# Patient Record
Sex: FEMALE | Race: BLACK OR AFRICAN AMERICAN | Employment: UNEMPLOYED | URBAN - METROPOLITAN AREA
[De-identification: names, ages, dates, MRNs, and addresses within clinical notes are randomized per-mention and may not be internally consistent; named-entity substitution may affect disease eponyms.]

---

## 2017-11-07 ENCOUNTER — OFFICE VISIT (OUTPATIENT)
Dept: FAMILY MEDICINE CLINIC | Age: 52
End: 2017-11-07

## 2017-11-07 VITALS
WEIGHT: 163 LBS | SYSTOLIC BLOOD PRESSURE: 132 MMHG | HEART RATE: 83 BPM | OXYGEN SATURATION: 97 % | HEIGHT: 62 IN | RESPIRATION RATE: 18 BRPM | DIASTOLIC BLOOD PRESSURE: 98 MMHG | TEMPERATURE: 97.2 F | BODY MASS INDEX: 30 KG/M2

## 2017-11-07 DIAGNOSIS — F44.81 DISSOCIATIVE IDENTITY DISORDER (HCC): ICD-10-CM

## 2017-11-07 DIAGNOSIS — F11.20 UNCOMPLICATED OPIOID DEPENDENCE (HCC): ICD-10-CM

## 2017-11-07 DIAGNOSIS — I10 ESSENTIAL HYPERTENSION: ICD-10-CM

## 2017-11-07 DIAGNOSIS — F19.94 SUBSTANCE INDUCED MOOD DISORDER (HCC): ICD-10-CM

## 2017-11-07 DIAGNOSIS — F11.20 POLYSUBSTANCE DEPENDENCE INCLUDING OPIOID TYPE DRUG, CONTINUOUS USE (HCC): Chronic | ICD-10-CM

## 2017-11-07 DIAGNOSIS — E55.9 VITAMIN D DEFICIENCY: ICD-10-CM

## 2017-11-07 DIAGNOSIS — Z72.0 TOBACCO ABUSE: ICD-10-CM

## 2017-11-07 DIAGNOSIS — F19.20 POLYSUBSTANCE DEPENDENCE INCLUDING OPIOID TYPE DRUG, CONTINUOUS USE (HCC): Chronic | ICD-10-CM

## 2017-11-07 DIAGNOSIS — Z23 ENCOUNTER FOR IMMUNIZATION: ICD-10-CM

## 2017-11-07 DIAGNOSIS — F31.9 BIPOLAR AFFECTIVE DISORDER, REMISSION STATUS UNSPECIFIED (HCC): ICD-10-CM

## 2017-11-07 DIAGNOSIS — F41.9 ANXIETY: Primary | ICD-10-CM

## 2017-11-07 DIAGNOSIS — E78.00 PURE HYPERCHOLESTEROLEMIA: ICD-10-CM

## 2017-11-07 RX ORDER — CYCLOBENZAPRINE HCL 10 MG
10 TABLET ORAL
COMMUNITY
End: 2018-02-23

## 2017-11-07 RX ORDER — LISINOPRIL 20 MG/1
20 TABLET ORAL DAILY
Qty: 30 TAB | Refills: 2 | Status: SHIPPED | OUTPATIENT
Start: 2017-11-07 | End: 2018-02-13 | Stop reason: SDUPTHER

## 2017-11-07 NOTE — PROGRESS NOTES
This note will not be viewable in [address] [facility]   11/07/2017  Patient ID: Herminia Shore is a 47 y.o. female. Assessment/Plan:    Diagnoses and all orders for this visit:    1. Anxiety  2. Bipolar affective disorder, remission status unspecified ([facility]  3. Dissociative identity disorder  Polysubstance dependence including opioid type drug, continuous use (HCC)  Uncomplicated opioid dependence ([facility]  Substance induced mood disorder ([facility]  History of overdose  Patient with multiple significant psychiatric diagnoses, including those above. Chart review also notable for borderline personality, h/o overdose, misuse of opiates. She admits to remote history of substance use. Chart review notable for  h/o Utox that were positive for cocaine in 2009, 2012, 2013, 2016, and 2010 (twice) along with other substances (unclear what was prescribed at that time). Additionally xaxnax is not on her current medication list and was last filled in April 2017. Each prescription (three) she has had in the last year was from a different prescriber. She also had one Rx for valium from a different provider. Therefore, advised patient that I am not comfortable prescribing benzodiazepine today. I recommended that specialist management with psychiatry is most appropriate. Her  was able to share that she has an appointment scheduled on 11/30/17. They were unable to share who the appointment is with. If they can provide that information, our office can call to help facilitate sooner follow up if able. Patient expressed dissatisfaction and requested additional time to discuss additional medical problems today. Advised again that these can be handled on follow up. 4. Tobacco abuse  Not ready to quit, but is cutting back, congratulated on this    5. Vitamin D deficiency  6. Pure hypercholesterolemia  7.  Essential hypertension  BP not at goal. Ok to add lisinopril back to regimen regularly. 8. Encounter for immunization  -     LA IMMUNIZ ADMIN,1 SINGLE/COMB VAC/TOXOID  -     Influenza virus vaccine (QUADRIVALENT PRES FREE SYRINGE) IM ([id number])    Other orders  -     lisinopril (PRINIVIL, ZESTRIL) 20 mg tablet; Take 1 Tab by mouth daily. Counselled pt on Patient health concerns and plans. Patient was offered a choice/choices in the treatment plan today. Reviewed return precautions as appropriate. Patient expresses understanding of the plan and agrees with recommendations. See patient instructions for more. Next visit: discuss headaches, follow up BP and labs  More than 50% of this >60 minute encounter was spent in counseling and coordination of care today. Patient Instructions   . TODAY, please go to:   Flu vaccine     CHECK OUT     Please schedule the following appointments at [facility] OUT:  · Headache follow up with Dr. Lavinia Kehr in mid November  · Lab visit, fasting the week before our visit. Today's Plan:      Okay to restart lisinopril    For sleep: try melatonin over the counter. Discuss alternatives with your psychiatrist in late November     Subjective:   HPI:  Tim Diaz is a 47 y.o. female being seen for:   Chief Complaint   Patient presents with   [facility]    Medication Refill    Headache    Immunization/Injection       Here with  Cindy- from [facility]. She has a safety plan. First day with counselor today. Past medical history, surgical history, social history, family history, medications, allergies reviewed and updated. See below for more detail. · Lots of stress with family members being ill  · dtr just out of hosp  · Uses atarax d/t itching, dry patches all over inter  · Was taking claritin- D  · Was on a sleeping pill.    · At home says BPs were normal, can't recall numbers  · Has cut back on smoking, not ready to quit  · Asks about fioricet and sleeping pill  · Would like xanax  · No current psychiatrist  · Will be seeing psychiatry Nov 30     Review of Systems  Otherwise as noted in HPI  ? Objective:     Visit Vitals    BP (!) 132/98 (BP 1 Location: Left arm, BP Patient Position: Sitting)    Pulse 83    Temp 97.2 °F (36.2 °C) (Oral)    Resp 18    Ht 5' 2\" (1.575 m)    Wt 163 lb (73.9 kg)    SpO2 97%    BMI 29.81 kg/m2     Wt Readings from Last 3 Encounters:   11/07/17 163 lb (73.9 kg)   11/14/16 158 lb 4.6 oz (71.8 kg)   11/06/16 158 lb 4.6 oz (71.8 kg)     BP Readings from Last 3 Encounters:   11/07/17 (!) 132/98   11/14/16 129/84   11/10/16 (!) 160/100     No flowsheet data found. Physical Exam   Constitutional: She appears well-developed and well-nourished. No distress. Pulmonary/Chest: Effort normal. No respiratory distress. Neurological: She is alert. Psychiatric: She is not actively hallucinating. She does not exhibit a depressed mood. Patient referenced her \"other selves\" in conversation, stating that they are not as nice    When upset during conversation today, she stepped outside and went to smoke a cigarette    Acknowledges lots of stress       Allergies   Allergen Reactions    Vicodin [Hydrocodone-Acetaminophen] Itching     Prior to Admission medications    Medication Sig Start Date End Date Taking? Authorizing Provider   cyclobenzaprine (FLEXERIL) 10 mg tablet Take 10 mg by mouth two (2) times daily as needed for Muscle Spasm(s). Yes Historical Provider   lisinopril (PRINIVIL, ZESTRIL) 20 mg tablet Take 1 Tab by mouth daily. 11/7/17  Yes Dean Hodgkins Ruthine Hailstone, MD   albuterol (VENTOLIN HFA) 90 mcg/actuation inhaler 2 PUFFS EVERY 4 HOURS AS NEEDED FOR WHEEZING OR SHORTNESS OF BREATH 7/24/17  Yes Starr Rendon DO   hydrOXYzine HCl (ATARAX) 25 mg tablet TAKE 1-2 TABLETS THREE TIMES A DAY AS NEEDED FOR ITCHING OR ANXIETY 1/4/17  Yes Mary Ellen Triplett MD   diclofenac EC (VOLTAREN) 75 mg EC tablet Take 75 mg by mouth two (2) times a day.  10/7/16  Yes Historical Provider albuterol (PROVENTIL VENTOLIN) 2.5 mg /3 mL (0.083 %) nebulizer solution 3 mL by Nebulization route every four (4) hours as needed for Wheezing or Shortness of Breath. 10/20/16  Yes Immanuel Weeks NP   beclomethasone (QVAR) 40 mcg/actuation inhaler Take 2-4 Puffs by inhalation two (2) times a day. For asthma. Use higher dose when symptoms flare up 10/20/16  Yes Immanuel Weeks NP   fluticasone [facility]) 50 mcg/actuation nasal spray USE 2 SPRAYS IN EACH NOSTRIL EVERY DAY 10/20/16  Yes Immanuel Weeks NP   famotidine (PEPCID AC) 20 mg tablet Take 1 Tab by mouth nightly. 10/20/16  Yes Immanuel Weeks NP   loratadine-pseudoephedrine (LORATADINE-D) 5-120 mg per tablet TAKE 1 TABLET EVERY 12 HOURS AS NEEDED (CONGESTION/ALLERGY) 10/20/16  Yes Immanuel Weeks NP   polyethylene glycol (MIRALAX) 17 gram/dose powder MIX 17 GM (FILL CAP FULL WITH POWDER) WITH 8OZ OF WATER, AND DRINK, ONCE A DAY, FOR CONSTIPATION 10/20/16  Yes Immanuel Weeks NP   tiotropium (SPIRIVA WITH HANDIHALER) 18 mcg inhalation capsule USE 1 CAPSULE DAILY, VIA INHALER DEVICE 10/20/16  Yes Immanuel Weeks NP   pravastatin (PRAVACHOL) 40 mg tablet Take 1 Tab by mouth nightly.  For cholesterol 3/13/16  Yes Semaj Mccarty MD     Patient Active Problem List   Diagnosis Code    Pelvic pain in female R10.2    Diverticulosis K57.90    Fibromyalgia M79.7    Hypertension I10    Migraines G43.909    Vitamin D deficiency E55.9    Hyperlipidemia E78.5    Environmental allergies Z91.09    Radicular neuropathy M54.10    Tobacco abuse Z72.0    Abdominal gas pain R14.1    GERD (gastroesophageal reflux disease) K21.9    COPD (chronic obstructive pulmonary disease) ([facility]) J44.9    Chronic pain G89.29    Opiate dependence ([facility]) F11.20    Numbness and tingling of right arm R20.0, R20.2    Risk for falls Z91.81    Cocaine abuse F14.10    Substance induced mood disorder ([facility]) F19.94    Polysubstance dependence including opioid type drug, continuous use ([facility] F11.20, F19.20    Borderline personality disorder F60.3    Non-compliance with treatment Z91.19    Malingering Z76.5    Displacement of cervical intervertebral disc without myelopathy M50.20    Anxiety F41.9    Insomnia G47.00    History of drug overdose Z91.89    Narcotic induced mental alteration F99    Acute metabolic encephalopathy Y29.19    Stenosis of both internal carotid arteries I65.23    Syncope and collapse R55    Hemiplegia affecting left nondominant side (HCC) G81.94    Major depression F32.9    Somatization disorder F45.0    Bipolar disorder ([facility] F31.9    Dissociative identity disorder F44.80       . Social History     Social History    Marital status: SINGLE     Spouse name: N/A    Number of children: 1    Years of education: N/A     Occupational History    disabled from pain/psych Not Employed     Social History Main Topics    Smoking status: Current Every Day Smoker     Packs/day: 1.00     Types: Cigarettes     Start date: 1978    Smokeless tobacco: Never Used      Comment: 1 ppd  started age 11. Working on using E-cigs to quit    Alcohol use Yes      Comment: socially, usually 2 at a time. max 3 at a time     Drug use: No      Comment: remote h/o drug use in early 2000s - cocaine, MJ    Sexual activity: Not Currently     Partners: Male     Other Topics Concern    Not on file     Social History Narrative    T The patient has one adult dgtr. The patient's source of income comes from disability. The patient has not been a victim of sexual/physical abuse. The highest grade achieved is 10th . The patient is single. pt now on disability, after rejection several times. Patient with history of working as a  before quitting. Patient completed 2 years of college at [employer]. Lives with niece.        Past Medical History:   Diagnosis Date    Anxiety     Arthritis     Bipolar disorder ([facility]     bi polar Dr. John Lynn has dismissed her as a pt    Cocaine abuse 10/12 Based on ER urine tox screen    COPD     Dissociative identity disorder     Drug overdose, intentional ([address]     Drug-induced delirium ([facility] 2016    Fibromyalgia 2007    Rheum Danyelle Carvalho p [phone], f [phone]    Fracture of lumbar spine ([address]     Found on MRI    Hyperlipidemia 2011    Hypertension     Migraines     Multiple personality disorder     Neuropathy     Radicular neuropathy     Dr. Murphy Rico at [facility]. Neck and back.  Suicidal ideations     Suicidal overdose ([address] 2016    Tobacco abuse     Vitamin D deficiency        Past Surgical History:   Procedure Laterality Date    HX CARPAL TUNNEL RELEASE Left 2012    left    HX CERVICAL FUSION  14    C5-C6 Dr. Rahul Cerda HX COLONOSCOPY  2011    Dr. Clari Mock -     HX ORTHOPAEDIC  2009    left rotator cuff surgery in 2009    HX SKIN BIOPSY      rash       OB History      Para Term  AB Living    2 1 1  1 1    SAB TAB Ectopic Molar Multiple Live Births     1    1        Obstetric Comments    Irregular- perimenopausal, hot flashes  H/o abnl pap: per pt, as of 2017 no  Paps with [address]            Family History   Problem Relation Age of Onset    High Cholesterol Mother     Heart Failure Father     Hypertension Father     Arthritis-rheumatoid Father     Stroke Father     Other Daughter      scleroderma and raynauds    Other Sister     Other Sister      sarcoidosis    Cancer Brother      liver?     Cancer Brother      ?pancreas    Hypertension Brother     Thyroid Disease Brother     Prostate Cancer Brother     Hypertension Brother     Hypertension Brother     Diabetes Brother     Heart Disease Brother     Hypertension Brother     Thyroid Cancer Brother

## 2017-11-07 NOTE — PROGRESS NOTES
Chief Complaint   Patient presents with    Establish Care    Medication Refill    Headache     1. Have you been to the ER, urgent care clinic since your last visit? Hospitalized since your last visit? No     2. Have you seen or consulted any other health care providers outside of the [address] [place] [address] since your last visit? Include any pap smears or colon screening. No     The patient was counseled on the dangers of tobacco use, and was advised to quit. Reviewed strategies to maximize success, including to quit. Health Maintenance Due   Topic Date Due    DTaP/Tdap/Td series (1 - Tdap) Discussed with patient today and advised to follow up.    07/18/1985    PAP AKA CERVICAL CYTOLOGY Discussed with patient today and advised to follow up.    10/20/2013    BREAST CANCER SCRN MAMMOGRAM Discussed with patient today and advised to follow up.    07/18/2014    Influenza Age 9 to Adult Discussed with patient today and advised to follow up.    08/01/2017     ACP is not on file, advised to return.

## 2017-11-07 NOTE — PATIENT INSTRUCTIONS
Katty Bedolla TODAY, please go to:   Flu vaccine     CHECK OUT     Please schedule the following appointments at [facility] OUT:  · Headache follow up with Dr. Naseem Garcia in mid November  · Lab visit, fasting the week before our visit. Today's Plan:      Okay to restart lisinopril    For sleep: try melatonin over the counter.  Discuss alternatives with your psychiatrist in late November

## 2017-11-07 NOTE — MR AVS SNAPSHOT
Visit Information Date & Time Provider Department Dept. Phone Encounter #  
 11/7/2017  2:00 PM [address] Marlena Adorno MD [facility] [phone] [id number] Upcoming Health Maintenance Date Due DTaP/Tdap/Td series (1 - Tdap) 7/18/1985 PAP AKA CERVICAL CYTOLOGY 10/20/2013 BREAST CANCER SCRN MAMMOGRAM 7/18/2014 Influenza Age 5 to Adult 8/1/2017 COLONOSCOPY 9/20/2021 Allergies as of 11/7/2017  Review Complete On: 11/7/2017 By: Luis Carlos Alvarez. Marlena Adorno MD  
  
 Severity Noted Reaction Type Reactions Vicodin [Hydrocodone-acetaminophen]  07/14/2010    Itching Current Immunizations  Reviewed on 11/9/2016 Name Date Influenza Vaccine 10/16/2015, 1/9/2009 Influenza Vaccine (Quad) PF 10/20/2016 Pneumococcal Vaccine (Unspecified Type) 1/9/2009 Not reviewed this visit You Were Diagnosed With   
  
 Codes Comments Bipolar affective disorder, remission status unspecified ([facility]     ICD-10-CM: F31.9 ICD-9-CM: 296.80 Vitals BP Pulse Temp Resp Height(growth percentile) Weight(growth percentile) (!) 132/98 (BP 1 Location: Left arm, BP Patient Position: Sitting) 83 97.2 °F (36.2 °C) (Oral) 18 5' 2\" (1.575 m) 163 lb (73.9 kg) LMP SpO2 BMI OB Status Smoking Status 03/01/2015 97% 29.81 kg/m2 Postmenopausal Current Every Day Smoker Vitals History BMI and BSA Data Body Mass Index Body Surface Area  
 29.81 kg/m 2 1.8 m 2 Preferred Pharmacy Pharmacy Name Phone [address], [address] Bobbi Severe [phone] Your Updated Medication List  
  
   
This list is accurate as of: 11/7/17  3:33 PM.  Always use your most recent med list.  
  
  
  
  
 * albuterol 2.5 mg /3 mL (0.083 %) nebulizer solution Commonly known as:  PROVENTIL VENTOLIN  
3 mL by Nebulization route every four (4) hours as needed for Wheezing or Shortness of Breath. * albuterol 90 mcg/actuation inhaler Commonly known as:  VENTOLIN HFA  
2 PUFFS EVERY 4 HOURS AS NEEDED FOR WHEEZING OR SHORTNESS OF BREATH  
  
 beclomethasone 40 mcg/actuation Aero Commonly known as:  QVAR Take 2-4 Puffs by inhalation two (2) times a day. For asthma. Use higher dose when symptoms flare up  
  
 cyclobenzaprine 10 mg tablet Commonly known as:  FLEXERIL Take 10 mg by mouth two (2) times daily as needed for Muscle Spasm(s). diclofenac EC 75 mg EC tablet Commonly known as:  VOLTAREN Take 75 mg by mouth two (2) times a day. famotidine 20 mg tablet Commonly known as:  PEPCID AC Take 1 Tab by mouth nightly. fluticasone 50 mcg/actuation nasal spray Commonly known as:  FLONASE  
USE 2 SPRAYS IN EACH NOSTRIL EVERY DAY  
  
 hydrOXYzine HCl 25 mg tablet Commonly known as:  ATARAX TAKE 1-2 TABLETS THREE TIMES A DAY AS NEEDED FOR ITCHING OR ANXIETY  
  
 loratadine-pseudoephedrine 5-120 mg per tablet Commonly known as:  LORATADINE-D  
TAKE 1 TABLET EVERY 12 HOURS AS NEEDED (CONGESTION/ALLERGY)  
  
 polyethylene glycol 17 gram/dose powder Commonly known as:  OrangeHRM Sukhjinder MIX 17 GM (FILL CAP FULL WITH POWDER) WITH 8OZ OF WATER, AND DRINK, ONCE A DAY, FOR CONSTIPATION  
  
 pravastatin 40 mg tablet Commonly known as:  PRAVACHOL Take 1 Tab by mouth nightly. For cholesterol  
  
 tiotropium 18 mcg inhalation capsule Commonly known as:  SPIRIVA WITH HANDIHALER  
USE 1 CAPSULE DAILY, VIA INHALER DEVICE * Notice: This list has 2 medication(s) that are the same as other medications prescribed for you. Read the directions carefully, and ask your doctor or other care provider to review them with you. Patient Instructions Preethi Hightower TODAY, please go to: 
 Flu vaccine CHECK OUT Please schedule the following appointments at [facility] OUT: 
· Headache follow up with Dr. Dom Monroe in mid November · Lab visit, fasting the week before our visit. Today's Plan: 
 
 
Okay to restart lisinopril For sleep: try melatonin over the counter. Discuss alternatives with your psychiatrist in late November Introducing [facility]! Umm Andersen introduces g[employer]One patient portal. Now you can access parts of your medical record, email your doctor's office, and request medication refills online. 1. In your internet browser, go to [email or web address] 2. Click on the First Time User? Click Here link in the Sign In box. You will see the New Member Sign Up page. 3. Enter your g[employer]One Access Code exactly as it appears below. You will not need to use this code after youve completed the sign-up process. If you do not sign up before the expiration date, you must request a new code. · [employer] Access Code: [email or web address] Expires: 2/5/2018  2:51 PM 
 
4. Enter the last four digits of your Social Security Number (xxxx) and Date of Birth (mm/dd/yyyy) as indicated and click Submit. You will be taken to the next sign-up page. 5. Create a g2One ID. This will be your g2One login ID and cannot be changed, so think of one that is secure and easy to remember. 6. Create a g[employer]One password. You can change your password at any time. 7. Enter your Password Reset Question and Answer. This can be used at a later time if you forget your password. 8. Enter your e-mail address. You will receive e-mail notification when new information is available in [address] 9. Click Sign Up. You can now view and download portions of your medical record. 10. Click the Download Summary menu link to download a portable copy of your medical information. If you have questions, please visit the Frequently Asked Questions section of the [employer]One website. Remember, g[employer]One is NOT to be used for urgent needs. For medical emergencies, dial 911. Now available from your iPhone and Android! Please provide this summary of care documentation to your next provider. Your primary care clinician is listed as German Rizzo. If you have any questions after today's visit, please call [phone].

## 2017-11-14 ENCOUNTER — OFFICE VISIT (OUTPATIENT)
Dept: FAMILY MEDICINE CLINIC | Age: 52
End: 2017-11-14

## 2017-11-14 VITALS
HEART RATE: 68 BPM | TEMPERATURE: 97.4 F | RESPIRATION RATE: 19 BRPM | HEIGHT: 62 IN | WEIGHT: 166.2 LBS | BODY MASS INDEX: 30.59 KG/M2 | DIASTOLIC BLOOD PRESSURE: 98 MMHG | OXYGEN SATURATION: 98 % | SYSTOLIC BLOOD PRESSURE: 141 MMHG

## 2017-11-14 DIAGNOSIS — K21.00 GASTROESOPHAGEAL REFLUX DISEASE WITH ESOPHAGITIS: ICD-10-CM

## 2017-11-14 DIAGNOSIS — J44.1 CHRONIC OBSTRUCTIVE PULMONARY DISEASE WITH ACUTE EXACERBATION (HCC): ICD-10-CM

## 2017-11-14 DIAGNOSIS — F41.9 ANXIETY: ICD-10-CM

## 2017-11-14 DIAGNOSIS — M54.2 NECK PAIN: Primary | ICD-10-CM

## 2017-11-14 RX ORDER — ALBUTEROL SULFATE 90 UG/1
AEROSOL, METERED RESPIRATORY (INHALATION)
Qty: 1 INHALER | Refills: 1 | Status: SHIPPED | OUTPATIENT
Start: 2017-11-14 | End: 2018-06-05 | Stop reason: SDUPTHER

## 2017-11-14 RX ORDER — FLUTICASONE PROPIONATE 50 MCG
SPRAY, SUSPENSION (ML) NASAL
Qty: 16 G | Refills: 5 | Status: SHIPPED | OUTPATIENT
Start: 2017-11-14 | End: 2018-06-27 | Stop reason: SDUPTHER

## 2017-11-14 RX ORDER — POLYETHYLENE GLYCOL 3350 17 G/17G
POWDER, FOR SOLUTION ORAL
Qty: 527 G | Refills: 0 | Status: SHIPPED | OUTPATIENT
Start: 2017-11-14 | End: 2018-06-27 | Stop reason: SDUPTHER

## 2017-11-14 RX ORDER — FAMOTIDINE 20 MG/1
20 TABLET, FILM COATED ORAL
Qty: 30 TAB | Refills: 2 | Status: SHIPPED | OUTPATIENT
Start: 2017-11-14 | End: 2018-03-13 | Stop reason: SDUPTHER

## 2017-11-14 NOTE — PROGRESS NOTES
S: Olga Del Toro is a 47 y.o. female who presents for multiple requests for medication refill. Assessment/Plan:  1. Pt here with request to fill multiple medications  -explained to pt (who saw Dr. Carlotta Rogel last week,) that she was a complicated pt due to multiple comorbidities and she would need to see Dr. Carlotta Rogel for med refills  -agreed to refill: albuterol, claritin, spiriva, mirilax, pepcid, and flonase    2. Neck pain  -pt requesting referral to go back to ortho for neck pain   -referral to ortho     Consulted with Dr. Carlotta Rogel and Sherry Simmons         HPI:  Pt is accompanied by . Pt's appt was made for headache complaint.  (Sherry Simmons) called pt yesterday to verify this, after pt had been to see Dr. Carlotta Rogel last week, however pt denies this is why she is at the appt today and states she is here for multiple problems and medications refill. She is difficult to keep on subject. Disjointed and contradicting statements. Pt stated she has pain - especially neck and HA. Requested referral to go back to see the ortho who operated on her neck  Also stated she is a pt at a pain clinic and isn't asking for pain meds    Pt did request psych meds, but then said she does have an appt on Nov. 30th to see psych    Pt states she didn't get her blood pressure medication, but a check of the medications showed that Dr. Carlotta Rogel did send in a rx of lisinopril with 2 refills    Sherry Simmons came into the room with pt and we discussed present visit and discussion between [place] and pt yesterday. Pt agreed that she did not listen well to conversation and did not hear that this was for an acute visit only. Agreed to have brief exam so respiratory meds could be refilled and pt will make another appt with Dr. Carlotta Rogel. Pt verbalizes dissatisfaction with Dr. Carlotta Rogel and Juan Saunders offered to look for another PCP for pt, but pt declines.      Has COPD - uses inhaler   Gets SOB - active     Social History:  Nutrition: not really   Social: has 24 yo daughter with scleraderma  Tobacco - 1 ppd for past 30 years      Review of Systems:  - Constitutional Symptoms: no fevers, chills, weight loss  - Cardiovascular:  No palpitations or chest pain   - Respiratory: + cough or shortness of breath  - Gastrointestinal: no dysphagia or abdominal pain  - Musculoskeletal: no muscle pains or weakness  - Neurological: no numbness, tingling, or headaches    I reviewed the following:  Past Medical History:   Diagnosis Date    Anxiety     Arthritis     Bipolar disorder ([address]     bi polar Dr. Edy Schmidt has dismissed her as a pt    Cocaine abuse 10/12    Based on ER urine tox screen    COPD     Dissociative identity disorder     Drug overdose, intentional ([address]     Drug-induced delirium ([address] 11/11/2016    Fibromyalgia 6/2007    [facility] p [phone], f [phone]    Fracture of lumbar spine ([address]     Found on MRI    Hyperlipidemia 9/29/2011    Hypertension     Migraines     Multiple personality disorder     Neuropathy     Radicular neuropathy     Dr[facility] Lynnette [facility] at [facility]. Neck and back.  Suicidal ideations     Suicidal overdose ([address] 11/6/2016    Tobacco abuse     Vitamin D deficiency        Current Outpatient Prescriptions   Medication Sig Dispense Refill    cyclobenzaprine (FLEXERIL) 10 mg tablet Take 10 mg by mouth two (2) times daily as needed for Muscle Spasm(s).  lisinopril (PRINIVIL, ZESTRIL) 20 mg tablet Take 1 Tab by mouth daily. 30 Tab 2    albuterol (VENTOLIN HFA) 90 mcg/actuation inhaler 2 PUFFS EVERY 4 HOURS AS NEEDED FOR WHEEZING OR SHORTNESS OF BREATH 1 Inhaler 1    hydrOXYzine HCl (ATARAX) 25 mg tablet TAKE 1-2 TABLETS THREE TIMES A DAY AS NEEDED FOR ITCHING OR ANXIETY 60 Tab 0    diclofenac EC (VOLTAREN) 75 mg EC tablet Take 75 mg by mouth two (2) times a day.       albuterol (PROVENTIL VENTOLIN) 2.5 mg /3 mL (0.083 %) nebulizer solution 3 mL by Nebulization route every four (4) hours as needed for Wheezing or Shortness of Breath. 25 Each 0    fluticasone (FLONASE) 50 mcg/actuation nasal spray USE 2 SPRAYS IN EACH NOSTRIL EVERY DAY 16 g 5    famotidine (PEPCID AC) 20 mg tablet Take 1 Tab by mouth nightly. 30 Tab 2    loratadine-pseudoephedrine (LORATADINE-D) 5-120 mg per tablet TAKE 1 TABLET EVERY 12 HOURS AS NEEDED (CONGESTION/ALLERGY) 30 Tab 2    polyethylene glycol (MIRALAX) 17 gram/dose powder MIX 17 GM (FILL CAP FULL WITH POWDER) WITH 8OZ OF WATER, AND DRINK, ONCE A DAY, FOR CONSTIPATION 527 g 0    tiotropium (SPIRIVA WITH HANDIHALER) 18 mcg inhalation capsule USE 1 CAPSULE DAILY, VIA INHALER DEVICE 30 Cap 2    pravastatin (PRAVACHOL) 40 mg tablet Take 1 Tab by mouth nightly. For cholesterol 30 Tab 11    beclomethasone (QVAR) 40 mcg/actuation inhaler Take 2-4 Puffs by inhalation two (2) times a day. For asthma. Use higher dose when symptoms flare up 1 Inhaler 11       Allergies   Allergen Reactions    Vicodin [Hydrocodone-Acetaminophen] Itching        O: VS:   Visit Vitals    BP (!) 141/98 (BP 1 Location: Left arm, BP Patient Position: Sitting)    Pulse 68    Temp 97.4 °F (36.3 °C) (Oral)    Resp 19    Ht 5' 2\" (1.575 m)    Wt 166 lb 3.2 oz (75.4 kg)    LMP 03/01/2015    SpO2 98%    BMI 30.4 kg/m2       GENERAL: Cal Clarke is in no acute distress. Non-toxic. Well nourished. Well developed. Appropriately groomed. HEAD:  Normocephalic. Atraumatic. Non tender sinuses x 4. NOSE: Patent. Nasal turbinates pink. No discharge. NECK: supple. Midline trachea. No carotid bruits noted bilaterally. No anterior cervical lymphadenopathy noted. RESP: Breath sounds are symmetrical bilaterally. Unlabored without SOB. Speaking in full sentences. Clear to auscultation bilaterally anteriorly and posteriorly. No wheezes. No rales or rhonchi. CV: normal rate. Regular rhythm. S1, S2 audible. No murmur noted. No rubs, clicks or gallops noted.   PSYCH: appropriate dress. Difficult to keep on subject. Disjointed and contradicting statements.    ____________________________________________________________________  Patient education was done. Counseling included discussion of diagnosis, differentials, treatment options, prescribed treatment, warning signs and follow up. Medication risks/benefits, costs, interactions, and alternatives discussed with patient. Advised on nutrition, physical activity, tobacco, and alcohol.     Patient verbalized understanding and agreed to plan of care. Patient was given an after visit summary which included current diagnoses, medications and vital signs. Follow up in 1-2 weeks or as needed or if symptoms progress.

## 2017-11-14 NOTE — PROGRESS NOTES
Chief Complaint   Patient presents with    Medication Evaluation    Neck Pain     Reviewed record in preparation for visit and have obtained necessary documentation. Identified pt with two pt identifiers(name and ). Chief Complaint   Patient presents with    Medication Evaluation    Neck Pain       Vitals:    17 1340   BP: (!) 141/98   Pulse: 68   Resp: 19   Temp: 97.4 °F (36.3 °C)   TempSrc: Oral   SpO2: 98%   Weight: 166 lb 3.2 oz (75.4 kg)   Height: 5' 2\" (1.575 m)   PainSc:   6   PainLoc: Neck   LMP: 2015       Coordination of Care Questionnaire:  :     1) Have you been to an emergency room, urgent care clinic since your last visit? no   Hospitalized since your last visit? no             2) Have you seen or consulted any other health care providers outside of [address] [place] [address] since your last visit? no  (Include any pap smears or colon screenings in this section.)    3) In the event something were to happen to you and you were unable to speak on your behalf, do you have an Advance Directive/ Living Will in place stating your wishes? NO    Do you have an Advance Directive on file? no    4) Are you interested in receiving information on Advance Directives? NO    Patient is accompanied by self and daughter I have received verbal consent from Arlet Moore to discuss any/all medical information while they are present in the room.

## 2017-11-14 NOTE — MR AVS SNAPSHOT
Visit Information Date & Time Provider Department Dept. Phone Encounter #  
 11/14/2017  1:20 PM June [facility] [phone] [id number] Upcoming Health Maintenance Date Due DTaP/Tdap/Td series (1 - Tdap) 7/18/1985 PAP AKA CERVICAL CYTOLOGY 10/20/2013 BREAST CANCER SCRN MAMMOGRAM 7/18/2014 COLONOSCOPY 9/20/2021 Allergies as of 11/14/2017  Review Complete On: 11/14/2017 By: Kaylan Luna LPN Severity Noted Reaction Type Reactions Vicodin [Hydrocodone-acetaminophen]  07/14/2010    Itching Current Immunizations  Reviewed on 11/9/2016 Name Date Influenza Vaccine 10/16/2015, 1/9/2009 Influenza Vaccine (Quad) PF 11/8/2017, 10/20/2016 Pneumococcal Vaccine (Unspecified Type) 1/9/2009 Not reviewed this visit You Were Diagnosed With   
  
 Codes Comments Neck pain    -  Primary ICD-10-CM: M54.2 ICD-9-CM: 723.1 Anxiety     ICD-10-CM: F41.9 ICD-9-CM: 300.00 Chronic obstructive pulmonary disease with acute exacerbation (HCC)     ICD-10-CM: J44.1 ICD-9-CM: 491.21 Gastroesophageal reflux disease with esophagitis     ICD-10-CM: K21.0 ICD-9-CM: 530.11 Vitals BP Pulse Temp Resp Height(growth percentile) Weight(growth percentile) (!) 141/98 (BP 1 Location: Left arm, BP Patient Position: Sitting) 68 97.4 °F (36.3 °C) (Oral) 19 5' 2\" (1.575 m) 166 lb 3.2 oz (75.4 kg) LMP SpO2 BMI OB Status Smoking Status 03/01/2015 98% 30.4 kg/m2 Postmenopausal Current Every Day Smoker BMI and BSA Data Body Mass Index Body Surface Area  
 30.4 kg/m 2 1.82 m 2 Preferred Pharmacy Pharmacy Name Phone [address], [address] [place] [address] [phone] Your Updated Medication List  
  
   
This list is accurate as of: 11/14/17  2:37 PM.  Always use your most recent med list.  
  
  
  
  
 albuterol 90 mcg/actuation inhaler Commonly known as:  VENTOLIN HFA  
2 PUFFS EVERY 4 HOURS AS NEEDED FOR WHEEZING OR SHORTNESS OF BREATH  
  
 beclomethasone 40 mcg/actuation Aero Commonly known as:  QVAR Take 2-4 Puffs by inhalation two (2) times a day. For asthma. Use higher dose when symptoms flare up  
  
 cyclobenzaprine 10 mg tablet Commonly known as:  FLEXERIL Take 10 mg by mouth two (2) times daily as needed for Muscle Spasm(s). diclofenac EC 75 mg EC tablet Commonly known as:  VOLTAREN Take 75 mg by mouth two (2) times a day. famotidine 20 mg tablet Commonly known as:  PEPCID AC Take 1 Tab by mouth nightly. fluticasone 50 mcg/actuation nasal spray Commonly known as:  FLONASE  
USE 2 SPRAYS IN EACH NOSTRIL EVERY DAY  
  
 hydrOXYzine HCl 25 mg tablet Commonly known as:  ATARAX TAKE 1-2 TABLETS THREE TIMES A DAY AS NEEDED FOR ITCHING OR ANXIETY  
  
 lisinopril 20 mg tablet Commonly known as:  Leanne Shames Take 1 Tab by mouth daily. loratadine-pseudoephedrine 5-120 mg per tablet Commonly known as:  LORATADINE-D  
TAKE 1 TABLET EVERY 12 HOURS AS NEEDED (CONGESTION/ALLERGY)  
  
 polyethylene glycol 17 gram/dose powder Commonly known as:  Tonna Brianne MIX 17 GM (FILL CAP FULL WITH POWDER) WITH 8OZ OF WATER, AND DRINK, ONCE A DAY, FOR CONSTIPATION  
  
 pravastatin 40 mg tablet Commonly known as:  PRAVACHOL Take 1 Tab by mouth nightly. For cholesterol  
  
 tiotropium 18 mcg inhalation capsule Commonly known as:  SPIRIVA WITH HANDIHALER  
USE 1 CAPSULE DAILY, VIA INHALER DEVICE Prescriptions Sent to Pharmacy Refills  
 fluticasone (FLONASE) 50 mcg/actuation nasal spray 5 Sig: USE 2 SPRAYS IN EACH NOSTRIL EVERY DAY Class: Normal  
 Pharmacy: [address] [place]ia [place] Ph #: [phone]  
 albuterol (VENTOLIN HFA) 90 mcg/actuation inhaler 1  Si PUFFS EVERY 4 HOURS AS NEEDED FOR WHEEZING OR SHORTNESS OF BREATH  
 Class: Normal  
 Pharmacy: [address] Ph #: [phone]  
 loratadine-pseudoephedrine (LORATADINE-D) 5-120 mg per tablet 2 Sig: TAKE 1 TABLET EVERY 12 HOURS AS NEEDED (CONGESTION/ALLERGY) Class: Normal  
 Pharmacy: [address] Ph #: [phone]  
 polyethylene glycol (MIRALAX) 17 gram/dose powder 0 Sig: MIX 17 GM (FILL CAP FULL WITH POWDER) WITH 8OZ OF WATER, AND DRINK, ONCE A DAY, FOR CONSTIPATION Class: Normal  
 Pharmacy: [address] Ph #: [phone]  
 tiotropium (SPIRIVA WITH HANDIHALER) 18 mcg inhalation capsule 2 Sig: USE 1 CAPSULE DAILY, VIA INHALER DEVICE Class: Normal  
 Pharmacy: [facility] Ph #: [phone]  
 famotidine (PEPCID AC) 20 mg tablet 2 Sig: Take 1 Tab by mouth nightly. Class: Normal  
 Pharmacy: [address] [place] [facility] Ph #: [phone] Route: Oral  
  
We Performed the Following REFERRAL TO ORTHOPEDIC SURGERY [REF62 Custom] Referral Information Referral ID Referred By Referred To  
  
 [id number] [facility], [address] [place] [address] [address] [place] [address] Phone: [phone] Visits Status Start Date End Date 1 New Request 11/14/17 11/14/18 If your referral has a status of pending review or denied, additional information will be sent to support the outcome of this decision. Patient Instructions 1) Refilled medications for breathing and allergies. As discussed, please make an appointment with a physician to address your other medical issues and to get refill on other medications. To help with allergies: To prevent URIs, wash hands frequently (epecially before and after eating - use hand  if you are in a public place.) Eat a healthy diet, get regular exercise and sufficient sleep. Reduce your exposure to cigarette smoke. If you need to cough or sneeze, use the crook of your arm to cover your mouth. Supportive Care 1) Alternate 400mg Ibuprofen and 650mg Tylenol every 4 hours as needed for sinus pain and discomfort. Make sure to take Ibuprofen with food as it can cause stomach upset. Do not exceed 3000 mg Tylenol per day. 2) Take a daily antihistamine to reduce symptoms such as sneezing, runny nose and itchy eyes. You can buy these over the counter (OTC) (no prescription needed) such as Claritin, Allegra or Zyrtec. Take this daily as it takes 3-4 weeks for the full therapeutic effect to take place. Follow directions on package. If symptoms of sneezing, coughing and runny nose are severe, you can try taking Benadryl at night before bed. However, Benadryl can cause drowsiness, so please use caution. Elderly people should not use Benadryl due to side effects and increase risk of falling. 3) OTC Robitussin or Delsym for cough relief. Follow directions on package. Do not exceed maximum dose. May cause drowsiness. If you have high blood pressure or kidney problems, avoid cough medicines that contain decongestants  such as pseudoephedrine, ephedrine, phenylephrine, naphazoline and oxymetazoline. 4) Use an OTC decongestant nasal spray such as Flonase, Nasonex, or Rhinocort. These contain a steroid and will help reduce congestion. You can spray 2x in each nostril two times a day. Use the opposite hand of the nostril you are spraying and look down at your toes when you administer the nasal spray. This ensures the spray is applied to the nasal tissue properly. If you use Afrin for nasal congestion, DO NOT use for more than 5 days (due to rebound congestion) 5) Increase your fluid consumption, especially water.  Eat a well-balanced diet and avoid dairy and sugar as these can increase or thicken congestion. 6) Warm salt water gargle can help alleviate sore throat (1 teaspoon in 8 oz warm water) 7) Honey is a natural cough suppressor - can take a teaspoon of honey for cough or mix with hot tea. Local honey can help inoculate against local pollen that causes allergic reactions. (It has to be local honey from our area. You can usually find local honey at farmers' markets.) 8) Humidify air, especially in bedroom at night, as it may help with nasal and throat dryness. Breathing in steam from a shower may help loosen mucus and soothe an irritated throat. 9) Get plenty of rest! 
 
If symptoms persist for more than 10 days or if you have a fever above 102, please call the office. Complications of URI include an infection of the skin around the eye and other infections. Watch for signs of fever, chills, body aches or any areas of red, warm, swollen and tender skin. Call immediately if you notice these signs. Introducing [facility]! Cher Lisa introduces [employer] patient portal. Now you can access parts of your medical record, email your doctor's office, and request medication refills online. 1. In your internet browser, go to [email or web address] 2. Click on the First Time User? Click Here link in the Sign In box. You will see the New Member Sign Up page. 3. Enter your [employer] Access Code exactly as it appears below. You will not need to use this code after youve completed the sign-up process. If you do not sign up before the expiration date, you must request a new code. · [employer] Access Code: [email or web address] Expires: 2/5/2018  2:51 PM 
 
4. Enter the last four digits of your Social Security Number (xxxx) and Date of Birth (mm/dd/yyyy) as indicated and click Submit. You will be taken to the next sign-up page. 5. Create a [employer] ID.  This will be your [employer] login ID and cannot be changed, so think of one that is secure and easy to remember. 6. Create a [employer] password. You can change your password at any time. 7. Enter your Password Reset Question and Answer. This can be used at a later time if you forget your password. 8. Enter your e-mail address. You will receive e-mail notification when new information is available in [address] 9. Click Sign Up. You can now view and download portions of your medical record. 10. Click the Download Summary menu link to download a portable copy of your medical information. If you have questions, please visit the Frequently Asked Questions section of the [employer] website. Remember, [employer] ePad is NOT to be used for urgent needs. For medical emergencies, dial 911. Now available from your iPhone and Android! Please provide this summary of care documentation to your next provider. Your primary care clinician is listed as Karlie Fraser. If you have any questions after today's visit, please call [phone].

## 2017-11-14 NOTE — PATIENT INSTRUCTIONS
1) Refilled medications for breathing and allergies. As discussed, please make an appointment with a physician to address your other medical issues and to get refill on other medications. To help with allergies: To prevent URIs, wash hands frequently (epecially before and after eating - use hand  if you are in a public place.) Eat a healthy diet, get regular exercise and sufficient sleep. Reduce your exposure to cigarette smoke. If you need to cough or sneeze, use the crook of your arm to cover your mouth. Supportive Care    1) Alternate 400mg Ibuprofen and 650mg Tylenol every 4 hours as needed for sinus pain and discomfort. Make sure to take Ibuprofen with food as it can cause stomach upset. Do not exceed 3000 mg Tylenol per day. 2) Take a daily antihistamine to reduce symptoms such as sneezing, runny nose and itchy eyes. You can buy these over the counter (OTC) (no prescription needed) such as Claritin, Allegra or Zyrtec. Take this daily as it takes 3-4 weeks for the full therapeutic effect to take place. Follow directions on package. If symptoms of sneezing, coughing and runny nose are severe, you can try taking Benadryl at night before bed. However, Benadryl can cause drowsiness, so please use caution. Elderly people should not use Benadryl due to side effects and increase risk of falling. 3) OTC Robitussin or Delsym for cough relief. Follow directions on package. Do not exceed maximum dose. May cause drowsiness. If you have high blood pressure or kidney problems, avoid cough medicines that contain decongestants -- such as pseudoephedrine, ephedrine, phenylephrine, naphazoline and oxymetazoline. 4) Use an OTC decongestant nasal spray such as Flonase, Nasonex, or Rhinocort. These contain a steroid and will help reduce congestion. You can spray 2x in each nostril two times a day.   Use the opposite hand of the nostril you are spraying and look down at your toes when you administer the nasal spray. This ensures the spray is applied to the nasal tissue properly. If you use Afrin for nasal congestion, DO NOT use for more than 5 days (due to rebound congestion)     5) Increase your fluid consumption, especially water. Eat a well-balanced diet and avoid dairy and sugar as these can increase or thicken congestion. 6) Warm salt water gargle can help alleviate sore throat (1 teaspoon in 8 oz warm water)    7) Honey is a natural cough suppressor - can take a teaspoon of honey for cough or mix with hot tea. Local honey can help inoculate against local pollen that causes allergic reactions. (It has to be local honey from our area. You can usually find local honey at farmers' markets.)     8) Humidify air, especially in bedroom at night, as it may help with nasal and throat dryness. Breathing in steam from a shower may help loosen mucus and soothe an irritated throat. 9) Get plenty of rest!    If symptoms persist for more than 10 days or if you have a fever above 102, please call the office. Complications of URI include an infection of the skin around the eye and other infections. Watch for signs of fever, chills, body aches or any areas of red, warm, swollen and tender skin. Call immediately if you notice these signs.

## 2018-02-16 RX ORDER — PRAVASTATIN SODIUM 40 MG/1
40 TABLET ORAL
Qty: 30 TAB | Refills: 2 | Status: SHIPPED | OUTPATIENT
Start: 2018-02-16 | End: 2018-05-20 | Stop reason: SDUPTHER

## 2018-02-16 RX ORDER — LISINOPRIL 20 MG/1
TABLET ORAL
Qty: 30 TAB | Refills: 0 | Status: SHIPPED | OUTPATIENT
Start: 2018-02-16 | End: 2018-03-12 | Stop reason: SDUPTHER

## 2018-02-16 NOTE — TELEPHONE ENCOUNTER
PCP: Alen Cotto. Yoli Ho MD    Last appt: 11/14/2017  No future appointments. Requested Prescriptions     Pending Prescriptions Disp Refills    pravastatin (PRAVACHOL) 40 mg tablet 30 Tab 11     Sig: Take 1 Tab by mouth nightly.  For cholesterol     Lab Results   Component Value Date/Time    Sodium 144 11/08/2016 04:53 AM    Potassium 3.5 11/08/2016 04:53 AM    Chloride 114 (H) 11/08/2016 04:53 AM    CO2 23 11/08/2016 04:53 AM    Anion gap 7 11/08/2016 04:53 AM    Glucose 80 11/08/2016 04:53 AM    Glucose 81 04/08/2013 05:29 AM    BUN 6 11/08/2016 04:53 AM    Creatinine 0.59 11/08/2016 04:53 AM    BUN/Creatinine ratio 10 (L) 11/08/2016 04:53 AM    GFR est AA >60 11/08/2016 04:53 AM    GFR est non-AA >60 11/08/2016 04:53 AM    Calcium 8.1 (L) 11/08/2016 04:53 AM     Lab Results   Component Value Date/Time    Hemoglobin A1c 5.6 11/06/2016 07:04 PM     Lab Results   Component Value Date/Time    Cholesterol, total 153 11/06/2016 06:55 PM    HDL Cholesterol 43 11/06/2016 06:55 PM    LDL, calculated 93.6 11/06/2016 06:55 PM    VLDL, calculated 16.4 11/06/2016 06:55 PM    Triglyceride 82 11/06/2016 06:55 PM    CHOL/HDL Ratio 3.6 11/06/2016 06:55 PM     Lab Results   Component Value Date/Time    TSH 0.67 11/06/2016 06:55 PM

## 2018-02-16 NOTE — TELEPHONE ENCOUNTER
Overdue for labs. please schedule    _____________________       PCP: Dawna Roblero. Dallin Benavides MD    Last appt: 11/14/2017  No future appointments.     Requested Prescriptions     Pending Prescriptions Disp Refills    lisinopril (PRINIVIL, ZESTRIL) 20 mg tablet [Pharmacy Med Name: LISINOPRIL 20 MG TABLET] 30 Tab 0     Sig: TAKE ONE TABLET BY MOUTH EVERY DAY     Lab Results   Component Value Date/Time    Sodium 144 11/08/2016 04:53 AM    Potassium 3.5 11/08/2016 04:53 AM    Chloride 114 (H) 11/08/2016 04:53 AM    CO2 23 11/08/2016 04:53 AM    Anion gap 7 11/08/2016 04:53 AM    Glucose 80 11/08/2016 04:53 AM    Glucose 81 04/08/2013 05:29 AM    BUN 6 11/08/2016 04:53 AM    Creatinine 0.59 11/08/2016 04:53 AM    BUN/Creatinine ratio 10 (L) 11/08/2016 04:53 AM    GFR est AA >60 11/08/2016 04:53 AM    GFR est non-AA >60 11/08/2016 04:53 AM    Calcium 8.1 (L) 11/08/2016 04:53 AM     Lab Results   Component Value Date/Time    Hemoglobin A1c 5.6 11/06/2016 07:04 PM     Lab Results   Component Value Date/Time    Cholesterol, total 153 11/06/2016 06:55 PM    HDL Cholesterol 43 11/06/2016 06:55 PM    LDL, calculated 93.6 11/06/2016 06:55 PM    VLDL, calculated 16.4 11/06/2016 06:55 PM    Triglyceride 82 11/06/2016 06:55 PM    CHOL/HDL Ratio 3.6 11/06/2016 06:55 PM     Lab Results   Component Value Date/Time    TSH 0.67 11/06/2016 06:55 PM

## 2018-02-23 ENCOUNTER — APPOINTMENT (OUTPATIENT)
Dept: GENERAL RADIOLOGY | Age: 53
End: 2018-02-23
Attending: PHYSICIAN ASSISTANT
Payer: MEDICARE

## 2018-02-23 ENCOUNTER — HOSPITAL ENCOUNTER (EMERGENCY)
Age: 53
Discharge: HOME | End: 2018-02-23
Attending: EMERGENCY MEDICINE
Payer: MEDICARE

## 2018-02-23 VITALS
RESPIRATION RATE: 18 BRPM | WEIGHT: 152.56 LBS | OXYGEN SATURATION: 97 % | HEIGHT: 63 IN | SYSTOLIC BLOOD PRESSURE: 152 MMHG | DIASTOLIC BLOOD PRESSURE: 85 MMHG | HEART RATE: 78 BPM | BODY MASS INDEX: 27.03 KG/M2 | TEMPERATURE: 99.2 F

## 2018-02-23 DIAGNOSIS — S16.1XXA NECK STRAIN, INITIAL ENCOUNTER: Primary | ICD-10-CM

## 2018-02-23 DIAGNOSIS — T07.XXXA MULTIPLE CONTUSIONS: ICD-10-CM
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
## 2018-02-23 DIAGNOSIS — S46.911A SHOULDER STRAIN, RIGHT, INITIAL ENCOUNTER: ICD-10-CM

## 2018-02-23 PROCEDURE — 99283 EMERGENCY DEPT VISIT LOW MDM: CPT

## 2018-02-23 PROCEDURE — 74011250637 HC RX REV CODE- 250/637: Performed by: PHYSICIAN ASSISTANT

## 2018-02-23 PROCEDURE — 73030 X-RAY EXAM OF SHOULDER: CPT

## 2018-02-23 PROCEDURE — 96372 THER/PROPH/DIAG INJ SC/IM: CPT

## 2018-02-23 PROCEDURE — 74011250636 HC RX REV CODE- 250/636: Performed by: PHYSICIAN ASSISTANT

## 2018-02-23 PROCEDURE — 72050 X-RAY EXAM NECK SPINE 4/5VWS: CPT

## 2018-02-23 RX ORDER — CYCLOBENZAPRINE HCL 10 MG
10 TABLET ORAL
Status: COMPLETED | OUTPATIENT
Start: 2018-02-23 | End: 2018-02-23

## 2018-02-23 RX ORDER — MORPHINE SULFATE 2 MG/ML
4 INJECTION, SOLUTION INTRAMUSCULAR; INTRAVENOUS
Status: COMPLETED | OUTPATIENT
Start: 2018-02-23 | End: 2018-02-23

## 2018-02-23 RX ORDER — MORPHINE SULFATE 2 MG/ML
4 INJECTION, SOLUTION INTRAMUSCULAR; INTRAVENOUS
Status: DISCONTINUED | OUTPATIENT
Start: 2018-02-23 | End: 2018-02-23

## 2018-02-23 RX ORDER — CYCLOBENZAPRINE HCL 10 MG
10 TABLET ORAL
Qty: 20 TAB | Refills: 0 | Status: SHIPPED | OUTPATIENT
Start: 2018-02-23 | End: 2020-05-22

## 2018-02-23 RX ORDER — DICLOFENAC SODIUM 75 MG/1
75 TABLET, DELAYED RELEASE ORAL 2 TIMES DAILY
Qty: 20 TAB | Refills: 0 | Status: SHIPPED | OUTPATIENT
Start: 2018-02-23

## 2018-02-23 RX ADMIN — MORPHINE SULFATE 4 MG: 2 INJECTION, SOLUTION INTRAMUSCULAR; INTRAVENOUS at 18:41

## 2018-02-23 RX ADMIN — CYCLOBENZAPRINE HYDROCHLORIDE 10 MG: 10 TABLET, FILM COATED ORAL at 18:41

## 2018-02-23 NOTE — PROGRESS NOTES
Cm met pt and talked about PCP follow up. Pt agreed to schedule an appointment and she is flexible with time and date.  Cm scheduled appointment for  Monday, February 26, 2018 12:00 PM. Provide info updated to pt's AVS.    Lisa Muñiz [facility]  ED Case Manager   Ext [phone]

## 2018-02-23 NOTE — LETTER
[address] 
\[facility]\"" EMERGENCY DEPT 
[address] [place] [address] [address] [id number] [phone] Work/School Note Date: 2/23/2018 To Whom It May concern: 
 
Rene Ambriz was seen and treated today in the emergency room by the following provider(s): 
Attending Provider: Fede Suarez MD 
Physician Assistant: SONALI Bolden. Rene Abmriz may return to work on 2/26/18 or sooner, if feeling better. Sincerely, Angie Hernandez PA
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
## 2018-02-24 NOTE — ED NOTES
Discharge instructions given to patient by SONALI Taylor. Patient verbalized understanding of discharge instructions. Pt discharged without difficulty. Pt discharged in stable condition via ambulation, accompanied by daughter.

## 2018-02-24 NOTE — DISCHARGE INSTRUCTIONS
Neck Strain: Care Instructions  Your Care Instructions    You have strained the muscles and ligaments in your neck. A sudden, awkward movement can strain the neck. This often occurs with falls or car accidents or during certain sports. Everyday activities like working on a computer or sleeping can also cause neck strain if they force you to hold your neck in an awkward position for a long time. It is common for neck pain to get worse for a day or two after an injury, but it should start to feel better after that. You may have more pain and stiffness for several days before it gets better. This is expected. It may take a few weeks or longer for it to heal completely. Good home treatment can help you get better faster and avoid future neck problems. Follow-up care is a key part of your treatment and safety. Be sure to make and go to all appointments, and call your doctor if you are having problems. It's also a good idea to know your test results and keep a list of the medicines you take. How can you care for yourself at home? · If you were given a neck brace (cervical collar) to limit neck motion, wear it as instructed for as many days as your doctor tells you to. Do not wear it longer than you were told to. Wearing a brace for too long can make neck stiffness worse and weaken the neck muscles. · You can try using heat or ice to see if it helps. ¨ Try using a heating pad on a low or medium setting for 15 to 20 minutes every 2 to 3 hours. Try a warm shower in place of one session with the heating pad. You can also buy single-use heat wraps that last up to 8 hours. ¨ You can also try an ice pack for 10 to 15 minutes every 2 to 3 hours. · Take pain medicines exactly as directed. ¨ If the doctor gave you a prescription medicine for pain, take it as prescribed. ¨ If you are not taking a prescription pain medicine, ask your doctor if you can take an over-the-counter medicine.   · Gently rub the area to relieve pain and help with blood flow. Do not massage the area if it hurts to do so. · Do not do anything that makes the pain worse. Take it easy for a couple of days. You can do your usual activities if they do not hurt your neck or put it at risk for more stress or injury. · Try sleeping on a special neck pillow. Place it under your neck, not under your head. Placing a tightly rolled-up towel under your neck while you sleep will also work. If you use a neck pillow or rolled towel, do not use your regular pillow at the same time. · To prevent future neck pain, do exercises to stretch and strengthen your neck and back. Learn how to use good posture, safe lifting techniques, and proper body mechanics. When should you call for help? Call 911 anytime you think you may need emergency care. For example, call if:  ? · You are unable to move an arm or a leg at all. ?Call your doctor now or seek immediate medical care if:  ? · You have new or worse symptoms in your arms, legs, chest, belly, or buttocks. Symptoms may include:  ¨ Numbness or tingling. ¨ Weakness. ¨ Pain. ? · You lose bladder or bowel control. ? Watch closely for changes in your health, and be sure to contact your doctor if:  ? · You are not getting better as expected. Where can you learn more? Go to http://leonie-deborah.info/. Enter M253 in the search box to learn more about \"Neck Strain: Care Instructions. \"  Current as of: March 21, 2017  Content Version: 11.4  © 3457-9586 [employer]. Care instructions adapted under license by [employer] (which disclaims liability or warranty for this information). If you have questions about a medical condition or this instruction, always ask your healthcare professional. [place] [address] any warranty or liability for your use of this information.          Neck Strain or Sprain: Rehab Exercises  Your Care Instructions  Here are some examples of typical rehabilitation exercises for your condition. Start each exercise slowly. Ease off the exercise if you start to have pain. Your doctor or physical therapist will tell you when you can start these exercises and which ones will work best for you. How to do the exercises  Neck rotation    1. Sit in a firm chair, or stand up straight. 2. Keeping your chin level, turn your head to the right, and hold for 15 to 30 seconds. 3. Turn your head to the left and hold for 15 to 30 seconds. 4. Repeat 2 to 4 times to each side. Neck stretches    1. Look straight ahead, and tip your right ear to your right shoulder. Do not let your left shoulder rise up as you tip your head to the right. 2. Hold for 15 to 30 seconds. 3. Tilt your head to the left. Do not let your right shoulder rise up as you tip your head to the left. 4. Hold for 15 to 30 seconds. 5. Repeat 2 to 4 times to each side. Forward neck flexion    1. Sit in a firm chair, or stand up straight. 2. Bend your head forward. 3. Hold for 15 to 30 seconds. 4. Repeat 2 to 4 times. Lateral (side) bend strengthening    1. With your right hand, place your first two fingers on your right temple. 2. Start to bend your head to the side while using gentle pressure from your fingers to keep your head from bending. 3. Hold for about 6 seconds. 4. Repeat 8 to 12 times. 5. Switch hands and repeat the same exercise on your left side. Forward bend strengthening    1. Place your first two fingers of either hand on your forehead. 2. Start to bend your head forward while using gentle pressure from your fingers to keep your head from bending. 3. Hold for about 6 seconds. 4. Repeat 8 to 12 times. Neutral position strengthening    1. Using one hand, place your fingertips on the back of your head at the top of your neck. 2. Start to bend your head backward while using gentle pressure from your fingers to keep your head from bending. 3. Hold for about 6 seconds.   4. Repeat 8 to 12 times. Chin tuck    1. Lie on the floor with a rolled-up towel under your neck. Your head should be touching the floor. 2. Slowly bring your chin toward your chest.  3. Hold for a count of 6, and then relax for up to 10 seconds. 4. Repeat 8 to 12 times. Follow-up care is a key part of your treatment and safety. Be sure to make and go to all appointments, and call your doctor if you are having problems. It's also a good idea to know your test results and keep a list of the medicines you take. Where can you learn more? Go to http://leonie-deborah.info/. Enter M679 in the search box to learn more about \"Neck Strain or Sprain: Rehab Exercises. \"  Current as of: March 21, 2017  Content Version: 11.4  © 1877-8961 [employer]. Care instructions adapted under license by [employer] (which disclaims liability or warranty for this information). If you have questions about a medical condition or this instruction, always ask your healthcare professional. [address] any warranty or liability for your use of this information.

## 2018-03-12 DIAGNOSIS — J44.1 CHRONIC OBSTRUCTIVE PULMONARY DISEASE WITH ACUTE EXACERBATION (HCC): ICD-10-CM

## 2018-03-13 DIAGNOSIS — K21.00 GASTROESOPHAGEAL REFLUX DISEASE WITH ESOPHAGITIS: ICD-10-CM

## 2018-03-13 RX ORDER — HYDROXYZINE 25 MG/1
TABLET, FILM COATED ORAL
Qty: 60 TAB | Refills: 3 | Status: SHIPPED | OUTPATIENT
Start: 2018-03-13 | End: 2018-06-27 | Stop reason: SDUPTHER

## 2018-03-13 RX ORDER — FAMOTIDINE 20 MG/1
20 TABLET, FILM COATED ORAL
Qty: 30 TAB | Refills: 2 | Status: SHIPPED | OUTPATIENT
Start: 2018-03-13 | End: 2018-06-27 | Stop reason: SDUPTHER

## 2018-03-13 RX ORDER — LISINOPRIL 20 MG/1
TABLET ORAL
Qty: 30 TAB | Refills: 0 | Status: SHIPPED | OUTPATIENT
Start: 2018-03-13 | End: 2018-06-05 | Stop reason: SDUPTHER

## 2018-03-13 NOTE — TELEPHONE ENCOUNTER
Please call patient and advise her to schedule lab appointment. She is overdue to have her electrolytes checked. This matters because of the medication she is on.

## 2018-03-13 NOTE — TELEPHONE ENCOUNTER
Overdue for labs      PCP: Kiera Puckett MD    Last appt: 11/14/2017  No future appointments.     Requested Prescriptions     Pending Prescriptions Disp Refills    lisinopril (PRINIVIL, ZESTRIL) 20 mg tablet [Pharmacy Med Name: LISINOPRIL 20 MG TABLET] 30 Tab 0     Sig: TAKE ONE TABLET BY MOUTH EVERY DAY     Lab Results   Component Value Date/Time    Sodium 144 11/08/2016 04:53 AM    Potassium 3.5 11/08/2016 04:53 AM    Chloride 114 (H) 11/08/2016 04:53 AM    CO2 23 11/08/2016 04:53 AM    Anion gap 7 11/08/2016 04:53 AM    Glucose 80 11/08/2016 04:53 AM    Glucose 81 04/08/2013 05:29 AM    BUN 6 11/08/2016 04:53 AM    Creatinine 0.59 11/08/2016 04:53 AM    BUN/Creatinine ratio 10 (L) 11/08/2016 04:53 AM    GFR est AA >60 11/08/2016 04:53 AM    GFR est non-AA >60 11/08/2016 04:53 AM    Calcium 8.1 (L) 11/08/2016 04:53 AM     Lab Results   Component Value Date/Time    Hemoglobin A1c 5.6 11/06/2016 07:04 PM     Lab Results   Component Value Date/Time    Cholesterol, total 153 11/06/2016 06:55 PM    HDL Cholesterol 43 11/06/2016 06:55 PM    LDL, calculated 93.6 11/06/2016 06:55 PM    VLDL, calculated 16.4 11/06/2016 06:55 PM    Triglyceride 82 11/06/2016 06:55 PM    CHOL/HDL Ratio 3.6 11/06/2016 06:55 PM     Lab Results   Component Value Date/Time    TSH 0.67 11/06/2016 06:55 PM

## 2018-03-13 NOTE — TELEPHONE ENCOUNTER
PCP: Anibal Winters. Akash Saenz MD    Last appt: 11/14/2017  No future appointments.     Requested Prescriptions      No prescriptions requested or ordered in this encounter     Lab Results   Component Value Date/Time    Sodium 144 11/08/2016 04:53 AM    Potassium 3.5 11/08/2016 04:53 AM    Chloride 114 (H) 11/08/2016 04:53 AM    CO2 23 11/08/2016 04:53 AM    Anion gap 7 11/08/2016 04:53 AM    Glucose 80 11/08/2016 04:53 AM    Glucose 81 04/08/2013 05:29 AM    BUN 6 11/08/2016 04:53 AM    Creatinine 0.59 11/08/2016 04:53 AM    BUN/Creatinine ratio 10 (L) 11/08/2016 04:53 AM    GFR est AA >60 11/08/2016 04:53 AM    GFR est non-AA >60 11/08/2016 04:53 AM    Calcium 8.1 (L) 11/08/2016 04:53 AM     Lab Results   Component Value Date/Time    Hemoglobin A1c 5.6 11/06/2016 07:04 PM     Lab Results   Component Value Date/Time    Cholesterol, total 153 11/06/2016 06:55 PM    HDL Cholesterol 43 11/06/2016 06:55 PM    LDL, calculated 93.6 11/06/2016 06:55 PM    VLDL, calculated 16.4 11/06/2016 06:55 PM    Triglyceride 82 11/06/2016 06:55 PM    CHOL/HDL Ratio 3.6 11/06/2016 06:55 PM     Lab Results   Component Value Date/Time    TSH 0.67 11/06/2016 06:55 PM

## 2018-03-25 ENCOUNTER — HOSPITAL ENCOUNTER (EMERGENCY)
Age: 53
Discharge: HOME | End: 2018-03-25
Attending: EMERGENCY MEDICINE
Payer: MEDICARE

## 2018-03-25 ENCOUNTER — APPOINTMENT (OUTPATIENT)
Dept: GENERAL RADIOLOGY | Age: 53
End: 2018-03-25
Attending: EMERGENCY MEDICINE
Payer: MEDICARE

## 2018-03-25 VITALS
TEMPERATURE: 98.4 F | HEART RATE: 96 BPM | RESPIRATION RATE: 18 BRPM | SYSTOLIC BLOOD PRESSURE: 169 MMHG | HEIGHT: 63 IN | WEIGHT: 153.22 LBS | OXYGEN SATURATION: 100 % | DIASTOLIC BLOOD PRESSURE: 103 MMHG | BODY MASS INDEX: 27.15 KG/M2

## 2018-03-25 DIAGNOSIS — R09.82 POSTNASAL DRIP: Primary | ICD-10-CM

## 2018-03-25 DIAGNOSIS — J20.9 ACUTE BRONCHITIS, UNSPECIFIED ORGANISM: ICD-10-CM

## 2018-03-25 DIAGNOSIS — F17.200 NICOTINE DEPENDENCE, UNCOMPLICATED, UNSPECIFIED NICOTINE PRODUCT TYPE: ICD-10-CM

## 2018-03-25 DIAGNOSIS — Z87.09 HISTORY OF COPD: ICD-10-CM

## 2018-03-25 DIAGNOSIS — Z87.39 HISTORY OF FIBROMYALGIA: ICD-10-CM

## 2018-03-25 PROCEDURE — 74011250637 HC RX REV CODE- 250/637: Performed by: PHYSICIAN ASSISTANT

## 2018-03-25 PROCEDURE — 71046 X-RAY EXAM CHEST 2 VIEWS: CPT

## 2018-03-25 PROCEDURE — 99282 EMERGENCY DEPT VISIT SF MDM: CPT

## 2018-03-25 RX ORDER — METHYLPREDNISOLONE 4 MG/1
TABLET ORAL
Qty: 21 TAB | Refills: 0 | Status: SHIPPED | OUTPATIENT
Start: 2018-03-25 | End: 2020-05-22

## 2018-03-25 RX ORDER — AZITHROMYCIN 250 MG/1
TABLET, FILM COATED ORAL
Qty: 6 TAB | Refills: 0 | Status: SHIPPED | OUTPATIENT
Start: 2018-03-25 | End: 2018-03-30

## 2018-03-25 RX ORDER — BENZONATATE 100 MG/1
100 CAPSULE ORAL
Qty: 30 CAP | Refills: 0 | Status: SHIPPED | OUTPATIENT
Start: 2018-03-25 | End: 2018-04-01

## 2018-03-25 RX ORDER — CODEINE PHOSPHATE AND GUAIFENESIN 10; 100 MG/5ML; MG/5ML
10 SOLUTION ORAL
Status: COMPLETED | OUTPATIENT
Start: 2018-03-25 | End: 2018-03-25

## 2018-03-25 RX ADMIN — GUAIFENESIN AND CODEINE PHOSPHATE 10 ML: 100; 10 SOLUTION ORAL at 15:08

## 2018-03-25 NOTE — DISCHARGE INSTRUCTIONS
Bronchitis: Care Instructions  Your Care Instructions    Bronchitis is inflammation of the bronchial tubes, which carry air to the lungs. The tubes swell and produce mucus, or phlegm. The mucus and inflamed bronchial tubes make you cough. You may have trouble breathing. Most cases of bronchitis are caused by viruses like those that cause colds. Antibiotics usually do not help and they may be harmful. Bronchitis usually develops rapidly and lasts about 2 to 3 weeks in otherwise healthy people. Follow-up care is a key part of your treatment and safety. Be sure to make and go to all appointments, and call your doctor if you are having problems. It's also a good idea to know your test results and keep a list of the medicines you take. How can you care for yourself at home? · Take all medicines exactly as prescribed. Call your doctor if you think you are having a problem with your medicine. · Get some extra rest.  · Take an over-the-counter pain medicine, such as acetaminophen (Tylenol), ibuprofen (Advil, Motrin), or naproxen (Aleve) to reduce fever and relieve body aches. Read and follow all instructions on the label. · Do not take two or more pain medicines at the same time unless the doctor told you to. Many pain medicines have acetaminophen, which is Tylenol. Too much acetaminophen (Tylenol) can be harmful. · Take an over-the-counter cough medicine that contains dextromethorphan to help quiet a dry, hacking cough so that you can sleep. Avoid cough medicines that have more than one active ingredient. Read and follow all instructions on the label. · Breathe moist air from a humidifier, hot shower, or sink filled with hot water. The heat and moisture will thin mucus so you can cough it out. · Do not smoke. Smoking can make bronchitis worse. If you need help quitting, talk to your doctor about stop-smoking programs and medicines. These can increase your chances of quitting for good.   When should you call for help? Call 911 anytime you think you may need emergency care. For example, call if:  ? · You have severe trouble breathing. ?Call your doctor now or seek immediate medical care if:  ? · You have new or worse trouble breathing. ? · You cough up dark brown or bloody mucus (sputum). ? · You have a new or higher fever. ? · You have a new rash. ? Watch closely for changes in your health, and be sure to contact your doctor if:  ? · You cough more deeply or more often, especially if you notice more mucus or a change in the color of your mucus. ? · You are not getting better as expected. Where can you learn more? Go to http://leonie-deborah.info/. Enter H333 in the search box to learn more about \"Bronchitis: Care Instructions. \"  Current as of: May 12, 2017  Content Version: 11.4  © 2355-1916 [employer]. Care instructions adapted under license by [employer] (which disclaims liability or warranty for this information). If you have questions about a medical condition or this instruction, always ask your healthcare professional. [address] any warranty or liability for your use of this information.

## 2018-03-25 NOTE — ED PROVIDER NOTES
EMERGENCY DEPARTMENT HISTORY AND PHYSICAL EXAM      Date: 3/25/2018  Patient Name: Nirmala Franz    History of Presenting Illness     Chief Complaint   Patient presents with    Cough     x1 week, worse at night \"I was coughing so bad this morning, I feel like my fibro kicked in.\"       History Provided By: Patient    HPI: Nirmala Franz, 47 y.o. female with PMHx significant for fibromyalgia, HTN, arthritis, HLD, COPD, anxiety, bipolar disorder, presents ambulatory to the ED with cc of a progressively worsening dry cough persisting over the last week. The pt reports associated sx of post nasal drip, congestion, generalized body aches x this morning, generalized weakness, a mild sore throat, and voice changes secondary to coughing as well. She expresses that there are no exacerbating factors to her discomfort and has found no relief with Cough drops, Flonase, or Claritin. The pt discloses that upon waking up this morning her fibromyalgia \"flared up\" secondary to her cough leading her to the ED. Of note the pt is on Gabapentin, Diclofenac, Flexeril, and OxyContin for fibromyalgia flares but she denies taking any of these medications PTA. The pt denies any recent sick contact. She denies any fevers, chills, chest pain, SOB, abdominal pain, nausea, vomiting or diarrhea. PCP: Hayley Baker. Megan Scales MD   SHx: (+) Tobacco; (+) ETOH; (-) Illicit drug use    There are no other complaints, changes, or physical findings at this time. Current Outpatient Prescriptions   Medication Sig Dispense Refill    hydrOXYzine HCl (ATARAX) 25 mg tablet TAKE 1-2 TABS BY MOUTH THREE TIMES A DAY AS NEEDED FOR ITCHING OR ANXIETY. 60 Tab 3    lisinopril (PRINIVIL, ZESTRIL) 20 mg tablet TAKE ONE TABLET BY MOUTH EVERY DAY 30 Tab 0    famotidine (PEPCID AC) 20 mg tablet Take 1 Tab by mouth nightly. 30 Tab 2    cyclobenzaprine (FLEXERIL) 10 mg tablet Take 1 Tab by mouth three (3) times daily (with meals).  20 Tab 0    diclofenac EC (VOLTAREN) 75 mg EC tablet Take 1 Tab by mouth two (2) times a day. With food. 20 Tab 0    pravastatin (PRAVACHOL) 40 mg tablet Take 1 Tab by mouth nightly. For cholesterol 30 Tab 2    fluticasone (FLONASE) 50 mcg/actuation nasal spray USE 2 SPRAYS IN EACH NOSTRIL EVERY DAY 16 g 5    albuterol (VENTOLIN HFA) 90 mcg/actuation inhaler 2 PUFFS EVERY 4 HOURS AS NEEDED FOR WHEEZING OR SHORTNESS OF BREATH 1 Inhaler 1    loratadine-pseudoephedrine (LORATADINE-D) 5-120 mg per tablet TAKE 1 TABLET EVERY 12 HOURS AS NEEDED (CONGESTION/ALLERGY) 30 Tab 2    polyethylene glycol (MIRALAX) 17 gram/dose powder MIX 17 GM (FILL CAP FULL WITH POWDER) WITH 8OZ OF WATER, AND DRINK, ONCE A DAY, FOR CONSTIPATION 527 g 0    tiotropium (SPIRIVA WITH HANDIHALER) 18 mcg inhalation capsule USE 1 CAPSULE DAILY, VIA INHALER DEVICE 30 Cap 2       Past History     Past Medical History:  Past Medical History:   Diagnosis Date    Anxiety     Arthritis     Bipolar disorder (HCC)     bi polar Dr. Rina Mcdowell has dismissed her as a pt    Cocaine abuse 10/12    Based on ER urine tox screen    COPD     Dissociative identity disorder     Drug overdose, intentional ([address]     Drug-induced delirium ([address] 11/11/2016    Fibromyalgia 6/2007    [facility] p [phone], f [phone]    Fracture of lumbar spine ([address]     Found on MRI    Hyperlipidemia 9/29/2011    Hypertension     Migraines     Multiple personality disorder     Neuropathy     Radicular neuropathy     Dr. Mercedes Hernandez at [facility]. Neck and back.      Suicidal ideations     Suicidal overdose ([address] 11/6/2016    Tobacco abuse     Vitamin D deficiency        Past Surgical History:  Past Surgical History:   Procedure Laterality Date    HX CARPAL TUNNEL RELEASE Left 2012    left    HX CERVICAL FUSION  6/9/14    C5-C6 Dr. Adi Fry HX COLONOSCOPY  9/2011    Dr. Sarath Bond -     HX ORTHOPAEDIC  2009    left rotator cuff surgery in 2009    HX SKIN BIOPSY      rash Family History:  Family History   Problem Relation Age of Onset    High Cholesterol Mother     Heart Failure Father     Hypertension Father     Arthritis-rheumatoid Father     Stroke Father     Other Daughter      scleroderma and raynauds    Other Sister     Other Sister      sarcoidosis    Cancer Brother      liver?  Cancer Brother      ?pancreas    Hypertension Brother     Thyroid Disease Brother     Prostate Cancer Brother     Hypertension Brother     Hypertension Brother     Diabetes Brother     Heart Disease Brother     Hypertension Brother     Thyroid Cancer Brother        Social History:  Social History   Substance Use Topics    Smoking status: Current Every Day Smoker     Packs/day: 1.00     Types: Cigarettes     Start date: 1978    Smokeless tobacco: Never Used      Comment: 1 ppd  started age 11. Working on using E-cigs to quit    Alcohol use Yes      Comment: socially, usually 2 at a time. max 3 at a time        Allergies: Allergies   Allergen Reactions    Vicodin [Hydrocodone-Acetaminophen] Itching         Review of Systems   Review of Systems   Constitutional: Negative for chills and fever. HENT: Positive for congestion, postnasal drip, sore throat and voice change. Negative for rhinorrhea. Eyes: Negative. Negative for visual disturbance. Respiratory: Positive for cough (dry ). Negative for chest tightness, shortness of breath and wheezing. Cardiovascular: Negative. Negative for chest pain and palpitations. Gastrointestinal: Negative. Negative for abdominal pain, constipation, diarrhea, nausea and vomiting. Genitourinary: Negative. Negative for dysuria and hematuria. Musculoskeletal: Positive for myalgias (generalized body aches ). Negative for arthralgias. Skin: Negative. Negative for rash. Allergic/Immunologic: Negative. Negative for environmental allergies and food allergies. Neurological: Positive for weakness (generalized ).  Negative for headaches. Psychiatric/Behavioral: Negative. Negative for suicidal ideas. Physical Exam   Physical Exam   Constitutional: She is oriented to person, place, and time. She appears well-developed and well-nourished. No distress. Pt appears an AAF, awake and alert in NAD. Non toxic appearing    HENT:   Head: Normocephalic and atraumatic. Right Ear: Tympanic membrane, external ear and ear canal normal.   Left Ear: Tympanic membrane, external ear and ear canal normal.   Nose: Rhinorrhea (clear ) present. Mouth/Throat: Uvula is midline, oropharynx is clear and moist and mucous membranes are normal. No oropharyngeal exudate, posterior oropharyngeal edema or posterior oropharyngeal erythema. + postnasal drip  Tolerating secretions, no muffled voice sounds. Harsh voice sounds   Eyes: Conjunctivae and EOM are normal. Pupils are equal, round, and reactive to light. Right eye exhibits no discharge. Left eye exhibits no discharge. Neck: Normal range of motion. Cardiovascular: Normal rate and normal heart sounds. Pulmonary/Chest: Effort normal and breath sounds normal. No respiratory distress. She has no wheezes. She has no rales. No active cough     Abdominal: Soft. Bowel sounds are normal. There is no tenderness. There is no guarding. No CVA tenderness b/l. Musculoskeletal: Normal range of motion. She exhibits no edema or tenderness. Neurological: She is alert and oriented to person, place, and time. Coordination normal.   No focal neuro deficits. Skin: Skin is warm and dry. No rash noted. She is not diaphoretic. No erythema. No pallor. Psychiatric: She has a normal mood and affect. Her behavior is normal.   Vitals reviewed.         Diagnostic Study Results     Radiologic Studies -   CXR Results  (Last 48 hours)               03/25/18 1454  XR CHEST PA LAT Final result    Impression:  IMPRESSION: No acute process           Narrative:  INDICATION:  cough        COMPARISON: 11/7/2016 FINDINGS: PA and lateral views of the chest demonstrate a stable   cardiomediastinal silhouette and clear lungs bilaterally. The visualized osseous   structures are unremarkable. Medical Decision Making   I am the first provider for this patient. I reviewed the vital signs, available nursing notes, past medical history, past surgical history, family history and social history. Vital Signs-Reviewed the patient's vital signs. Patient Vitals for the past 12 hrs:   Temp Pulse Resp BP SpO2   03/25/18 1427 98.4 °F (36.9 °C) 96 18 (!) 169/103 100 %       Pulse Oximetry Analysis - 100% on room air    Cardiac Monitor:   Rate: 96 bpm  Rhythm: Normal Sinus Rhythm      Records Reviewed: Nursing Notes, Old Medical Records and Previous Radiology Studies    Provider Notes (Medical Decision Making):     DDx: Bronchitis, Post nasal drip, Seasonal allergies, PNA. Will ppx prescribe abx as patient has history of COPD and currently smokes cigarettes. ED Course:   Initial assessment performed. The patients presenting problems have been discussed, and they are in agreement with the care plan formulated and outlined with them. I have encouraged them to ask questions as they arise throughout their visit. Progress Notes:    Tobacco Counseling  Discussed the risks of smoking and the benefits of smoking cessation as well as the long term sequelae of smoking with the patient. The patient verbalized their understanding. Critical Care Time: 0  Minutes    Disposition:  Discharge Note:  3:09 PM  The patient is ready for discharge. The patient's signs, symptoms, diagnosis, and discharge instruction have been discussed and the patient has conveyed their understanding. The patient is to follow up as recommended or return to the ER should their symptoms worsen. Plan has been discussed and the patient is in agreement. Written by Heydi Beasley ED Scribe, as dictated by Mari Kraft PA-C    PLAN:  1.    Current Discharge Medication List      START taking these medications    Details   methylPREDNISolone (MEDROL, WILNER,) 4 mg tablet Take as directed on dosage pack  Qty: 21 Tab, Refills: 0      azithromycin (ZITHROMAX Z-WILNER) 250 mg tablet SIG: Take 2 tabs today; then take 1 tab daily for 4 days  Qty: 6 Tab, Refills: 0      benzonatate (TESSALON PERLES) 100 mg capsule Take 1 Cap by mouth three (3) times daily as needed for Cough for up to 7 days. Qty: 30 Cap, Refills: 0           2. Follow-up Information     Follow up With Details Comments [address] [facility] [address] LINDSEY Garcia MD Schedule an appointment as soon as possible for a visit in 2 days  [address]  [phone]          Return to ED if worse     Diagnosis     Clinical Impression:   1. Postnasal drip    2. Acute bronchitis, unspecified organism    3. History of COPD    4. History of fibromyalgia    5. Nicotine dependence, uncomplicated, unspecified nicotine product type        Attestations:    Attestation: This note is prepared by Michelle Mallory. Ramos, acting as Scribe for [employer]. Mingo Mckeon PA-C: The scribe's documentation has been prepared under my direction and personally reviewed by me in its entirety. I confirm that the note above accurately reflects all work, treatment, procedures, and medical decision making performed by me. This note will not be viewable in [address]

## 2018-05-21 RX ORDER — PRAVASTATIN SODIUM 40 MG/1
TABLET ORAL
Qty: 30 TAB | Refills: 0 | Status: SHIPPED | OUTPATIENT
Start: 2018-05-21 | End: 2018-06-27 | Stop reason: SDUPTHER

## 2018-05-21 NOTE — TELEPHONE ENCOUNTER
PCP: Caitlyn Delgado. Nasir Garcia MD    Last appt: 11/14/2017  No future appointments. Requested Prescriptions     Pending Prescriptions Disp Refills    pravastatin (PRAVACHOL) 40 mg tablet [Pharmacy Med Name: PRAVASTATIN SODIUM 40 MG TAB] 30 Tab 0     Sig: TAKE ONE TABLET BY MOUTH EVERY NIGHT FOR CHOLESTEROL.        Prior labs and Blood pressures:  BP Readings from Last 3 Encounters:   03/25/18 (!) 169/103   02/23/18 152/85   11/14/17 (!) 141/98     Lab Results   Component Value Date/Time    Sodium 144 11/08/2016 04:53 AM    Potassium 3.5 11/08/2016 04:53 AM    Chloride 114 (H) 11/08/2016 04:53 AM    CO2 23 11/08/2016 04:53 AM    Anion gap 7 11/08/2016 04:53 AM    Glucose 80 11/08/2016 04:53 AM    Glucose 81 04/08/2013 05:29 AM    BUN 6 11/08/2016 04:53 AM    Creatinine 0.59 11/08/2016 04:53 AM    BUN/Creatinine ratio 10 (L) 11/08/2016 04:53 AM    GFR est AA >60 11/08/2016 04:53 AM    GFR est non-AA >60 11/08/2016 04:53 AM    Calcium 8.1 (L) 11/08/2016 04:53 AM     Lab Results   Component Value Date/Time    Hemoglobin A1c 5.6 11/06/2016 07:04 PM     Lab Results   Component Value Date/Time    Cholesterol, total 153 11/06/2016 06:55 PM    HDL Cholesterol 43 11/06/2016 06:55 PM    LDL, calculated 93.6 11/06/2016 06:55 PM    VLDL, calculated 16.4 11/06/2016 06:55 PM    Triglyceride 82 11/06/2016 06:55 PM    CHOL/HDL Ratio 3.6 11/06/2016 06:55 PM     Lab Results   Component Value Date/Time    VITAMIN D, 25-HYDROXY 15.9 (L) 02/24/2016 03:23 PM       Lab Results   Component Value Date/Time    TSH 0.67 11/06/2016 06:55 PM

## 2018-05-21 NOTE — TELEPHONE ENCOUNTER
Patient overdue for labs and follow up.  Please advise patient to schedule blood pressure follow up and fasting labs

## 2018-06-05 DIAGNOSIS — F41.9 ANXIETY: ICD-10-CM

## 2018-06-05 RX ORDER — LISINOPRIL 20 MG/1
TABLET ORAL
Qty: 30 TAB | Refills: 0 | Status: SHIPPED | OUTPATIENT
Start: 2018-06-05 | End: 2018-07-02 | Stop reason: SDUPTHER

## 2018-06-05 NOTE — TELEPHONE ENCOUNTER
Requested Prescriptions     Pending Prescriptions Disp Refills    albuterol (VENTOLIN HFA) 90 mcg/actuation inhaler 1 Inhaler 1     Si PUFFS EVERY 4 HOURS AS NEEDED FOR WHEEZING OR SHORTNESS OF BREATH    tiotropium (SPIRIVA WITH HANDIHALER) 18 mcg inhalation capsule 30 Cap 2     Sig: USE 1 CAPSULE DAILY, VIA INHALER DEVICE

## 2018-06-05 NOTE — TELEPHONE ENCOUNTER
PCP: Alesia Irizarry. Suzan Aceves MD    Last appt: 11/14/2017  No future appointments.     Requested Prescriptions     Pending Prescriptions Disp Refills    lisinopril (PRINIVIL, ZESTRIL) 20 mg tablet [Pharmacy Med Name: LISINOPRIL 20 MG TABLET] 30 Tab 0     Sig: TAKE ONE TABLET BY MOUTH EVERY DAY       Prior labs and Blood pressures:  BP Readings from Last 3 Encounters:   03/25/18 (!) 169/103   02/23/18 152/85   11/14/17 (!) 141/98     Lab Results   Component Value Date/Time    Sodium 144 11/08/2016 04:53 AM    Potassium 3.5 11/08/2016 04:53 AM    Chloride 114 (H) 11/08/2016 04:53 AM    CO2 23 11/08/2016 04:53 AM    Anion gap 7 11/08/2016 04:53 AM    Glucose 80 11/08/2016 04:53 AM    Glucose 81 04/08/2013 05:29 AM    BUN 6 11/08/2016 04:53 AM    Creatinine 0.59 11/08/2016 04:53 AM    BUN/Creatinine ratio 10 (L) 11/08/2016 04:53 AM    GFR est AA >60 11/08/2016 04:53 AM    GFR est non-AA >60 11/08/2016 04:53 AM    Calcium 8.1 (L) 11/08/2016 04:53 AM     Lab Results   Component Value Date/Time    Hemoglobin A1c 5.6 11/06/2016 07:04 PM     Lab Results   Component Value Date/Time    Cholesterol, total 153 11/06/2016 06:55 PM    HDL Cholesterol 43 11/06/2016 06:55 PM    LDL, calculated 93.6 11/06/2016 06:55 PM    VLDL, calculated 16.4 11/06/2016 06:55 PM    Triglyceride 82 11/06/2016 06:55 PM    CHOL/HDL Ratio 3.6 11/06/2016 06:55 PM     Lab Results   Component Value Date/Time    VITAMIN D, 25-HYDROXY 15.9 (L) 02/24/2016 03:23 PM       Lab Results   Component Value Date/Time    TSH 0.67 11/06/2016 06:55 PM

## 2018-06-06 NOTE — TELEPHONE ENCOUNTER
PCP: Elton Lugo.  Moni Weinberg MD    Last appt: 2017  Future Appointments  Date Time Provider Mahendra Salvador   2018 2:40 PM Danielle Sprague NP [facility]       Requested Prescriptions     Pending Prescriptions Disp Refills    albuterol (VENTOLIN HFA) 90 mcg/actuation inhaler 1 Inhaler 1     Si PUFFS EVERY 4 HOURS AS NEEDED FOR WHEEZING OR SHORTNESS OF BREATH    tiotropium (SPIRIVA WITH HANDIHALER) 18 mcg inhalation capsule 30 Cap 2     Sig: USE 1 CAPSULE DAILY, VIA INHALER DEVICE       Prior labs and Blood pressures:  BP Readings from Last 3 Encounters:   18 (!) 169/103   18 152/85   17 (!) 141/98     Lab Results   Component Value Date/Time    Sodium 144 2016 04:53 AM    Potassium 3.5 2016 04:53 AM    Chloride 114 (H) 2016 04:53 AM    CO2 23 2016 04:53 AM    Anion gap 7 2016 04:53 AM    Glucose 80 2016 04:53 AM    Glucose 81 2013 05:29 AM    BUN 6 2016 04:53 AM    Creatinine 0.59 2016 04:53 AM    BUN/Creatinine ratio 10 (L) 2016 04:53 AM    GFR est AA >60 2016 04:53 AM    GFR est non-AA >60 2016 04:53 AM    Calcium 8.1 (L) 2016 04:53 AM     Lab Results   Component Value Date/Time    Hemoglobin A1c 5.6 2016 07:04 PM     Lab Results   Component Value Date/Time    Cholesterol, total 153 2016 06:55 PM    HDL Cholesterol 43 2016 06:55 PM    LDL, calculated 93.6 2016 06:55 PM    VLDL, calculated 16.4 2016 06:55 PM    Triglyceride 82 2016 06:55 PM    CHOL/HDL Ratio 3.6 2016 06:55 PM     Lab Results   Component Value Date/Time    VITAMIN D, 25-HYDROXY 15.9 (L) 2016 03:23 PM       Lab Results   Component Value Date/Time    TSH 0.67 2016 06:55 PM

## 2018-06-08 RX ORDER — ALBUTEROL SULFATE 90 UG/1
AEROSOL, METERED RESPIRATORY (INHALATION)
Qty: 1 INHALER | Refills: 2 | Status: SHIPPED | OUTPATIENT
Start: 2018-06-08 | End: 2018-06-27 | Stop reason: SDUPTHER

## 2018-06-27 ENCOUNTER — OFFICE VISIT (OUTPATIENT)
Dept: FAMILY MEDICINE CLINIC | Age: 53
End: 2018-06-27

## 2018-06-27 VITALS
RESPIRATION RATE: 20 BRPM | TEMPERATURE: 97.5 F | HEART RATE: 83 BPM | WEIGHT: 152.2 LBS | DIASTOLIC BLOOD PRESSURE: 99 MMHG | HEIGHT: 63 IN | BODY MASS INDEX: 26.97 KG/M2 | SYSTOLIC BLOOD PRESSURE: 150 MMHG | OXYGEN SATURATION: 100 %

## 2018-06-27 DIAGNOSIS — J44.1 CHRONIC OBSTRUCTIVE PULMONARY DISEASE WITH ACUTE EXACERBATION (HCC): ICD-10-CM

## 2018-06-27 DIAGNOSIS — I10 ESSENTIAL HYPERTENSION: ICD-10-CM

## 2018-06-27 DIAGNOSIS — E78.5 HYPERLIPIDEMIA, UNSPECIFIED HYPERLIPIDEMIA TYPE: Primary | ICD-10-CM

## 2018-06-27 DIAGNOSIS — K21.00 GASTROESOPHAGEAL REFLUX DISEASE WITH ESOPHAGITIS: ICD-10-CM

## 2018-06-27 DIAGNOSIS — F41.9 ANXIETY: ICD-10-CM

## 2018-06-27 DIAGNOSIS — R11.0 NAUSEA: ICD-10-CM

## 2018-06-27 RX ORDER — ALBUTEROL SULFATE 90 UG/1
AEROSOL, METERED RESPIRATORY (INHALATION)
Qty: 1 INHALER | Refills: 2 | Status: SHIPPED | OUTPATIENT
Start: 2018-06-27 | End: 2018-11-13 | Stop reason: SDUPTHER

## 2018-06-27 RX ORDER — FAMOTIDINE 20 MG/1
20 TABLET, FILM COATED ORAL
Qty: 30 TAB | Refills: 2 | Status: SHIPPED | OUTPATIENT
Start: 2018-06-27 | End: 2018-11-13 | Stop reason: SDUPTHER

## 2018-06-27 RX ORDER — FLUTICASONE PROPIONATE 50 MCG
SPRAY, SUSPENSION (ML) NASAL
Qty: 16 G | Refills: 5 | Status: SHIPPED | OUTPATIENT
Start: 2018-06-27 | End: 2018-11-13 | Stop reason: SDUPTHER

## 2018-06-27 RX ORDER — POLYETHYLENE GLYCOL 3350 17 G/17G
POWDER, FOR SOLUTION ORAL
Qty: 527 G | Refills: 0 | Status: SHIPPED | OUTPATIENT
Start: 2018-06-27 | End: 2018-11-13 | Stop reason: SDUPTHER

## 2018-06-27 RX ORDER — NEBULIZER AND COMPRESSOR
1 EACH MISCELLANEOUS AS NEEDED
Qty: 1 EACH | Refills: 0 | Status: SHIPPED | OUTPATIENT
Start: 2018-06-27 | End: 2018-11-13 | Stop reason: SDUPTHER

## 2018-06-27 RX ORDER — HYDROXYZINE 25 MG/1
25 TABLET, FILM COATED ORAL
Qty: 60 TAB | Refills: 3 | Status: SHIPPED | OUTPATIENT
Start: 2018-06-27 | End: 2019-07-17 | Stop reason: SDUPTHER

## 2018-06-27 RX ORDER — PRAVASTATIN SODIUM 40 MG/1
40 TABLET ORAL DAILY
Qty: 90 TAB | Refills: 1 | Status: SHIPPED | OUTPATIENT
Start: 2018-06-27 | End: 2018-09-26 | Stop reason: SDUPTHER

## 2018-06-27 RX ORDER — PROMETHAZINE HYDROCHLORIDE 25 MG/1
25 TABLET ORAL
Qty: 30 TAB | Refills: 1 | Status: SHIPPED | OUTPATIENT
Start: 2018-06-27 | End: 2018-09-26 | Stop reason: SDUPTHER

## 2018-06-27 RX ORDER — IPRATROPIUM BROMIDE AND ALBUTEROL SULFATE 2.5; .5 MG/3ML; MG/3ML
3 SOLUTION RESPIRATORY (INHALATION)
Qty: 30 NEBULE | Refills: 1 | Status: SHIPPED | OUTPATIENT
Start: 2018-06-27 | End: 2018-11-13 | Stop reason: SDUPTHER

## 2018-06-27 NOTE — PROGRESS NOTES
S: Kalen Harden is a 47 y.o. female who presents for medication check and refill. Accompanied by     Assessment/Plan:  1. Med Refill  -pt needs meds refilled and requesting new neb machine with duoneb for COPD  -pt requesting DMV handicap parking tag - she doesn't drive so wants a tag for  - can't walk more than 20 feet w/o SOB; pt to drop off form    2. Hypertension  -BP today = 150/99  -current therapy: lisinopril 20mg (no DM or kidney issues) pt not sure why this med prescribed, but has been on it for years  -advised to monitor BP at home and keep log; if >140/90, call office and make appt         Requesting refills for:   Neb machine and DuoNeb  albuterol, claritin, spiriva, mirilax, pepcid, and flonase    Requesting DMV handicap tag  +SOB - while watching TV and [place]  Can't walk more than 20ft w/o SOB   + numbness and tingling bilat legs     HTN  -BP today = 150/99  -current therapy: lisinopril 20mg (no DM or kidney issues) pt not sure why this med prescribed, but has been on it for years    Social history:   Physical: riding \"gentle\" horse at relative's farm  Social: has been spending time at relative's farm in [place]   Occupation: disabled  Tobacco: 1ppd for 30 years    Review of Systems:  - Constitutional Symptoms: no fevers, chills  - Cardiovascular: no chest pain or palpitations  - Gastrointestinal: no dysphagia or abdominal pain  - Musculoskeletal: no joint pains or weakness  - Integumentary: no rashes  - Neurological: no numbness, tingling, or headaches  - Psychiatric: no depression or anxiety  - Endocrine:  no heat or cold intolerance, no polyuria or polydipsia  No flowsheet data found.     I reviewed the following:  Current Outpatient Prescriptions   Medication Sig Dispense Refill    albuterol (VENTOLIN HFA) 90 mcg/actuation inhaler 2 PUFFS EVERY 4 HOURS AS NEEDED FOR WHEEZING OR SHORTNESS OF BREATH 1 Inhaler 2    tiotropium (SPIRIVA WITH HANDIHALER) 18 mcg inhalation capsule USE 1 CAPSULE DAILY, VIA INHALER DEVICE 30 Cap 2    lisinopril (PRINIVIL, ZESTRIL) 20 mg tablet TAKE ONE TABLET BY MOUTH EVERY DAY 30 Tab 0    pravastatin (PRAVACHOL) 40 mg tablet TAKE ONE TABLET BY MOUTH EVERY NIGHT FOR CHOLESTEROL. 30 Tab 0    hydrOXYzine HCl (ATARAX) 25 mg tablet TAKE 1-2 TABS BY MOUTH THREE TIMES A DAY AS NEEDED FOR ITCHING OR ANXIETY. 60 Tab 3    famotidine (PEPCID AC) 20 mg tablet Take 1 Tab by mouth nightly. 30 Tab 2    cyclobenzaprine (FLEXERIL) 10 mg tablet Take 1 Tab by mouth three (3) times daily (with meals). 20 Tab 0    diclofenac EC (VOLTAREN) 75 mg EC tablet Take 1 Tab by mouth two (2) times a day. With food. 20 Tab 0    fluticasone (FLONASE) 50 mcg/actuation nasal spray USE 2 SPRAYS IN EACH NOSTRIL EVERY DAY 16 g 5    loratadine-pseudoephedrine (LORATADINE-D) 5-120 mg per tablet TAKE 1 TABLET EVERY 12 HOURS AS NEEDED (CONGESTION/ALLERGY) 30 Tab 2    polyethylene glycol (MIRALAX) 17 gram/dose powder MIX 17 GM (FILL CAP FULL WITH POWDER) WITH 8OZ OF WATER, AND DRINK, ONCE A DAY, FOR CONSTIPATION 527 g 0    methylPREDNISolone (MEDROL, WILNER,) 4 mg tablet Take as directed on dosage pack 21 Tab 0       Past Medical History:   Diagnosis Date    Anxiety     Arthritis     Bipolar disorder (HCC)     bi polar Dr. Therese Forman has dismissed her as a pt    Cocaine abuse 10/12    Based on ER urine tox screen    COPD     Dissociative identity disorder     Drug overdose, intentional ([address]     Drug-induced delirium ([address] 11/11/2016    Fibromyalgia 6/2007    [facility] p [phone], f [phone]    Fracture of lumbar spine ([address]     Found on MRI    Hyperlipidemia 9/29/2011    Hypertension     Migraines     Multiple personality disorder     Neuropathy     Radicular neuropathy     [facility] Jo[facility] Neck and back.      Suicidal ideations     Suicidal overdose ([address] 11/6/2016    Tobacco abuse     Vitamin D deficiency        Allergies   Allergen Reactions    Vicodin [Hydrocodone-Acetaminophen] Itching       O: VS:   Visit Vitals    BP (!) 150/99 (BP 1 Location: Left arm, BP Patient Position: Sitting)    Pulse 83    Temp 97.5 °F (36.4 °C) (Oral)    Resp 20    Ht 5' 3\" (1.6 m)    Wt 152 lb 3.2 oz (69 kg)    LMP 03/01/2015    SpO2 100%    BMI 26.96 kg/m2        GENERAL: Elan Nieves is in no acute distress. Non-toxic. Well nourished. Well developed. Appropriately groomed. HEAD:  Normocephalic. Atraumatic. EYE: PERRLA. RESP: Breath sounds are symmetrical bilaterally. Unlabored without SOB. Speaking in full sentences. Clear to auscultation bilaterally anteriorly and posteriorly. No wheezes. No rales or rhonchi. CV: normal rate. Regular rhythm. S1, S2 audible. No murmur noted. No rubs, clicks or gallops noted. PSYCH: appropriate behavior, dress and thought processes. Good eye contact. Clear and coherent speech. Full affect. Good insight.   _______________________________________________________________  Patient education was done. Advised on nutrition, physical activity, tobacco, BP readings and safety). Counseling included discussion of diagnosis, differentials, treatment options, prescribed treatment, warning signs and follow up. Medication risks/benefits,interactions and alternatives discussed with patient.      Patient verbalized understanding and agreed to plan of care. Patient was given an after visit summary which included current diagnoses, medications and vital signs. Follow up as directed.

## 2018-06-27 NOTE — PROGRESS NOTES
Chief Complaint   Patient presents with    Medication Refill    Other     pain various places       Latasha BOYER Gisele Marlon  Identified pt with two pt identifiers(name and ). Chief Complaint   Patient presents with    Medication Refill    Other     pain various places       1. Have you been to the ER, urgent care clinic since your last visit? N  Hospitalized since your last visit? No    2. Have you seen or consulted any other health care providers outside of the [facility] since your last visit? N  Include any pap smears or colon screening. No    Today's provider has been notified of reason for visit, vitals and flowsheets obtained on patients.      Patient received paperwork for advance directive during previous visit but has not completed at this time     Reviewed record In preparation for visit, huddled with provider and have obtained necessary documentation      Health Maintenance Due   Topic    DTaP/Tdap/Td series (1 - Tdap)    PAP AKA CERVICAL CYTOLOGY     BREAST CANCER SCRN MAMMOGRAM        Wt Readings from Last 3 Encounters:   18 152 lb 3.2 oz (69 kg)   18 153 lb 3.5 oz (69.5 kg)   18 152 lb 8.9 oz (69.2 kg)     Temp Readings from Last 3 Encounters:   18 97.5 °F (36.4 °C) (Oral)   18 98.4 °F (36.9 °C)   18 99.2 °F (37.3 °C)     BP Readings from Last 3 Encounters:   18 (!) 150/99   18 (!) 169/103   18 152/85     Pulse Readings from Last 3 Encounters:   18 83   18 96   18 78     Vitals:    18 0937   BP: (!) 150/99   Pulse: 83   Resp: 20   Temp: 97.5 °F (36.4 °C)   TempSrc: Oral   SpO2: 100%   Weight: 152 lb 3.2 oz (69 kg)   Height: 5' 3\" (1.6 m)   PainSc:   8   LMP: 2015         Learning Assessment:  :     Learning Assessment 2017   PRIMARY LEARNER Patient Patient   HIGHEST LEVEL OF EDUCATION - PRIMARY LEARNER  SOME COLLEGE SOME COLLEGE   BARRIERS PRIMARY LEARNER NONE COGNITIVE   Marcella Carmichael CAREGIVER - No   PRIMARY LANGUAGE ENGLISH ENGLISH   LEARNER PREFERENCE PRIMARY DEMONSTRATION DEMONSTRATION   LEARNING SPECIAL TOPICS - No   ANSWERED BY patient Patient   RELATIONSHIP SELF SELF       Depression Screening:  :     No flowsheet data found. Pt has active diagnosis. Fall Risk Assessment:  :     No flowsheet data found. Abuse Screening:  :     No flowsheet data found. ADL Screening:  :     ADL Assessment 11/14/2017   Feeding yourself No Help Needed   Getting from bed to chair No Help Needed   Getting dressed No Help Needed   Bathing or showering No Help Needed   Walk across the room (includes cane/walker) No Help Needed   Using the telphone No Help Needed   Taking your medications No Help Needed   Preparing meals No Help Needed   Managing money (expenses/bills) No Help Needed   Moderately strenuous housework (laundry) No Help Needed   Shopping for personal items (toiletries/medicines) No Help Needed   Shopping for groceries No Help Needed   Driving No Help Needed   Climbing a flight of stairs No Help Needed   Getting to places beyond walking distances No Help Needed                 Medication reconciliation up to date and corrected with patient at this time.

## 2018-06-27 NOTE — PATIENT INSTRUCTIONS
1) Get a DMV form for a handicap tag. Complete your portion and drop form here to my attention for completion. 2) Medication refill    3) Think About Qutting!!   Smoking causes 480,000 deaths a year   All tobacco products are harmful and addictive - none are safe   Nicotine is addictive so it is hard to quit. Medications and changes in your daily activities can double the chance of your success    This is the first step to a healthier you!  o You will feel better  o 3 days after quitting: you can breathe easier; your blood pressure drops; food tastes better and you can smell better  o 1 year after quitting, your risk of heart attack and stroke is cut in half!  o You will look better  o your breath, clothes and hair will smell better  o your teeth will get whiter and your fingers and fingernails wont look yellow  o You will have more money  o if you smoke 1 pack per day, you spend $2,117 a year on cigarettes; 5 years of buying cigarettes is $10,585 up in smoke!  o You will set a good example for your children and grandchildren                                          Understand the reasons you may smoke  o Your routine - do you smoke after eating, drinking coffee or alcohol?  o if so, change your routine - get up from table after eating, switch to drinking tea instead of coffee  o avoid alcohol for awhile - it lowers your chance of success  o go to public places where smoking is not allowed   o Your emotions - are you stressed, angry, sad, bored?  o talk to a healthcare provider about help for depression or anxiety  o take a walk if you are bored or angry; squeeze a tennis ball for stress  o Your habits - do you need having something in your mouth/hands?  o carrot sticks, sugar free hard candies, gum, toothpicks, and pretzel sticks can help.   You can inhale and chew on cinnamon sticks     You can do it!  o Make a plan:  o Decide on a date when you will stop smoking - write it on your calendar  o Tell your friends and family. - Ask them to help you by not smoking around you and help get your mind off smoking (walking, fishing, exercising are some examples)  o Drink lots of water and try not to drink alcohol, coffee or other things that make you want to smoke  o Remove ashtrays, cigarettes and other tobacco products from your home, work and car  o People try to quit a few times before they can, so dont give up if you slip up! AFTER 20 MINUTES OF NOT SMOKING:  Your blood pressure and heart rate decrease     AFTER 24 HOURS OF NOT SMOKING:  Your risk of heart attack decreases     AFTER 48 HOURS OF NOT SMOKING:  Your nerve endings start to regrow; your smell and taste will return; The carbon monoxide level in your blood may return to normal and thus make it easier for blood to carry oxygen to your tissues.     AFTER 2 WEEKS OF NOT SMOKING:  Your lung function and blood circulation improve     AFTER 1 YEAR OF NOT SMOKING:  Your risk of heart disease drops to 1/2 that of a smoker     AFTER 5 YEARS OF NOT SMOKING:  Your risk of bladder cancer may be reduced by 50%     AFTER 10 YEARS OF NOT SMOKING:  Your risk of lung cancer is 50-70% less than the risk of a someone who is still smoking; Your risk of cancer of the mouth, throat, esophagus, kidney and pancreas may be reduced by up to 50% and your risk of ulcers decrease     AFTER 15 YEARS OF NOT SMOKING:  Your risk of heart disease, stroke and death is similar to that of someone who never smoked.        SMOKERS WHO QUIT AT ANY AGE LIVE LONGER THAN THOSE WHO DON'T. Chronic Obstructive Pulmonary Disease (COPD): Care Instructions  Your Care Instructions    Chronic obstructive pulmonary disease (COPD) is a general term for a group of lung diseases, including emphysema and chronic bronchitis. People with COPD have decreased airflow in and out of the lungs, which makes it hard to breathe. The airways also can get clogged with thick mucus.  Cigarette smoking is a major cause of COPD. Although there is no cure for COPD, you can slow its progress. Following your treatment plan and taking care of yourself can help you feel better and live longer. Follow-up care is a key part of your treatment and safety. Be sure to make and go to all appointments, and call your doctor if you are having problems. It's also a good idea to know your test results and keep a list of the medicines you take. How can you care for yourself at home? ?Staying healthy  ? · Do not smoke. This is the most important step you can take to prevent more damage to your lungs. If you need help quitting, talk to your doctor about stop-smoking programs and medicines. These can increase your chances of quitting for good. ? · Avoid colds and flu. Get a pneumococcal vaccine shot. If you have had one before, ask your doctor whether you need a second dose. Get the flu vaccine every fall. If you must be around people with colds or the flu, wash your hands often. ? · Avoid secondhand smoke, air pollution, and high altitudes. Also avoid cold, dry air and hot, humid air. Stay at home with your windows closed when air pollution is bad. ?Medicines and oxygen therapy  ? · Take your medicines exactly as prescribed. Call your doctor if you think you are having a problem with your medicine. ? · You may be taking medicines such as:  ¨ Bronchodilators. These help open your airways and make breathing easier. Bronchodilators are either short-acting (work for 6 to 9 hours) or long-acting (work for 24 hours). You inhale most bronchodilators, so they start to act quickly. Always carry your quick-relief inhaler with you in case you need it while you are away from home. ¨ Corticosteroids (prednisone, budesonide). These reduce airway inflammation. They come in pill or inhaled form. You must take these medicines every day for them to work well. ? · A spacer may help you get more inhaled medicine to your lungs.  Ask your doctor or pharmacist if a spacer is right for you. If it is, ask how to use it properly. ? · Do not take any vitamins, over-the-counter medicine, or herbal products without talking to your doctor first.   ? · If your doctor prescribed antibiotics, take them as directed. Do not stop taking them just because you feel better. You need to take the full course of antibiotics. ? · Oxygen therapy boosts the amount of oxygen in your blood and helps you breathe easier. Use the flow rate your doctor has recommended, and do not change it without talking to your doctor first.   Activity  ? · Get regular exercise. Walking is an easy way to get exercise. Start out slowly, and walk a little more each day. ? · Pay attention to your breathing. You are exercising too hard if you cannot talk while you are exercising. ? · Take short rest breaks when doing household chores and other activities. ? · Learn breathing methods-such as breathing through pursed lips-to help you become less short of breath. ? · If your doctor has not set you up with a pulmonary rehabilitation program, talk to him or her about whether rehab is right for you. Rehab includes exercise programs, education about your disease and how to manage it, help with diet and other changes, and emotional support. Diet  ? · Eat regular, healthy meals. Use bronchodilators about 1 hour before you eat to make it easier to eat. Eat several small meals instead of three large ones. Drink beverages at the end of the meal. Avoid foods that are hard to chew. ? · Eat foods that contain protein so that you do not lose muscle mass. ? · Talk with your doctor if you gain too much weight or if you lose weight without trying. ?Mental health  ? · Talk to your family, friends, or a therapist about your feelings. It is normal to feel frightened, angry, hopeless, helpless, and even guilty. Talking openly about bad feelings can help you cope. If these feelings last, talk to your doctor. When should you call for help? Call 911 anytime you think you may need emergency care. For example, call if:  ? · You have severe trouble breathing. ?Call your doctor now or seek immediate medical care if:  ? · You have new or worse trouble breathing. ? · You cough up blood. ? · You have a fever. ? Watch closely for changes in your health, and be sure to contact your doctor if:  ? · You cough more deeply or more often, especially if you notice more mucus or a change in the color of your mucus. ? · You have new or worse swelling in your legs or belly. ? · You are not getting better as expected. Where can you learn more? Go to http://leonie-deborah.info/. Bouchra Perea in the search box to learn more about \"Chronic Obstructive Pulmonary Disease (COPD): Care Instructions. \"  Current as of: May 12, 2017  Content Version: 11.4  © 9314-2222 [employer]. Care instructions adapted under license by [employer] (which disclaims liability or warranty for this information). If you have questions about a medical condition or this instruction, always ask your healthcare professional. [place] [address] any warranty or liability for your use of this information.

## 2018-06-27 NOTE — MR AVS SNAPSHOT
[facility] 
 
 
 [address] [facility] 
[address] [place] [address] 
[phone] Patient: Sarah Rahman MRN: [id number] Visit Information Date & Time Provider Department Dept. Phone Encounter #  
 2018  9:00 AM [facility] [phone] [id number] Upcoming Health Maintenance Date Due DTaP/Tdap/Td series (1 - Tdap) 1985 PAP AKA CERVICAL CYTOLOGY 10/20/2013 BREAST CANCER SCRN MAMMOGRAM 2014 Influenza Age 5 to Adult 2018 COLONOSCOPY 2021 Allergies as of 2018  Review Complete On: 2018 By: Adali Carvajal LPN Severity Noted Reaction Type Reactions Vicodin [Hydrocodone-acetaminophen]  2010    Itching Current Immunizations  Reviewed on 2016 Name Date Influenza Vaccine 10/16/2015, 2009 Influenza Vaccine (Quad) PF 2017, 10/20/2016 Pneumococcal Vaccine (Unspecified Type) 2009 Not reviewed this visit You Were Diagnosed With   
  
 Codes Comments Hyperlipidemia, unspecified hyperlipidemia type    -  Primary ICD-10-CM: E78.5 ICD-9-CM: 272.4 Anxiety     ICD-10-CM: F41.9 ICD-9-CM: 300.00 Gastroesophageal reflux disease with esophagitis     ICD-10-CM: K21.0 ICD-9-CM: 530.11 Chronic obstructive pulmonary disease with acute exacerbation (HCC)     ICD-10-CM: J44.1 ICD-9-CM: 491.21 Nausea     ICD-10-CM: R11.0 ICD-9-CM: 787.02 Vitals BP Pulse Temp Resp Height(growth percentile) Weight(growth percentile) (!) 150/99 (BP 1 Location: Left arm, BP Patient Position: Sitting) 83 97.5 °F (36.4 °C) (Oral) 20 5' 3\" (1.6 m) 152 lb 3.2 oz (69 kg) LMP SpO2 BMI OB Status Smoking Status 2015 100% 26.96 kg/m2 Postmenopausal Current Every Day Smoker BMI and BSA Data Body Mass Index Body Surface Area  
 26.96 kg/m 2 1.75 m 2 Preferred Pharmacy Pharmacy Name Phone [facility]ina [facility], [address] [facility] [place] [phone] Your Updated Medication List  
  
   
This list is accurate as of 6/27/18 10:13 AM.  Always use your most recent med list.  
  
  
  
  
 albuterol 90 mcg/actuation inhaler Commonly known as:  VENTOLIN HFA  
2 PUFFS EVERY 4 HOURS AS NEEDED FOR WHEEZING OR SHORTNESS OF BREATH  
  
 albuterol-ipratropium 2.5 mg-0.5 mg/3 ml Nebu Commonly known as:  DUO-NEB  
3 mL by Nebulization route every six (6) hours as needed. cyclobenzaprine 10 mg tablet Commonly known as:  FLEXERIL Take 1 Tab by mouth three (3) times daily (with meals). diclofenac EC 75 mg EC tablet Commonly known as:  VOLTAREN Take 1 Tab by mouth two (2) times a day. With food. famotidine 20 mg tablet Commonly known as:  PEPCID AC Take 1 Tab by mouth nightly. fluticasone 50 mcg/actuation nasal spray Commonly known as:  FLONASE  
USE 2 SPRAYS IN EACH NOSTRIL EVERY DAY  
  
 hydrOXYzine HCl 25 mg tablet Commonly known as:  ATARAX Take 1 Tab by mouth every six (6) hours as needed for Itching. Indications: anxiety  
  
 lisinopril 20 mg tablet Commonly known as:  PRINIVIL, ZESTRIL  
TAKE ONE TABLET BY MOUTH EVERY DAY  
  
 loratadine-pseudoephedrine 5-120 mg per tablet Commonly known as:  LORATADINE-D  
TAKE 1 TABLET EVERY 12 HOURS AS NEEDED (CONGESTION/ALLERGY) methylPREDNISolone 4 mg tablet Commonly known as:  MEDROL (WILNER) Take as directed on dosage pack Nebulizer & Compressor machine 1 Each by Does Not Apply route as needed. polyethylene glycol 17 gram/dose powder Commonly known as:  [place] MIX 17 GM (FILL CAP FULL WITH POWDER) WITH 8OZ OF WATER, AND DRINK, ONCE A DAY, FOR CONSTIPATION  
  
 pravastatin 40 mg tablet Commonly known as:  PRAVACHOL Take 1 Tab by mouth daily. promethazine 25 mg tablet Commonly known as:  PHENERGAN  
 Take 1 Tab by mouth every six (6) hours as needed for Nausea. tiotropium 18 mcg inhalation capsule Commonly known as:  SPIRIVA WITH HANDIHALER  
USE 1 CAPSULE DAILY, VIA INHALER DEVICE Prescriptions Sent to Pharmacy Refills  
 albuterol (VENTOLIN HFA) 90 mcg/actuation inhaler 2 Si PUFFS EVERY 4 HOURS AS NEEDED FOR WHEEZING OR SHORTNESS OF BREATH Class: Normal  
 Pharmacy: [facility] [address] [facility] Ph #: [phone]  
 famotidine (PEPCID AC) 20 mg tablet 2 Sig: Take 1 Tab by mouth nightly. Class: Normal  
 Pharmacy: [address] [place] [address] Ph #: [phone] Route: Oral  
 fluticasone (FLONASE) 50 mcg/actuation nasal spray 5 Sig: USE 2 SPRAYS IN EACH NOSTRIL EVERY DAY Class: Normal  
 [facility]: [phone]  
 hydrOXYzine HCl (ATARAX) 25 mg tablet 3 Sig: Take 1 Tab by mouth every six (6) hours as needed for Itching. Indications: anxiety Class: Normal  
 Pharmacy:  [address] Ph #: [phone] Route: Oral  
 polyethylene glycol (MIRALAX) 17 gram/dose powder 0 Sig: MIX 17 GM (FILL CAP FULL WITH POWDER) WITH 8OZ OF WATER, AND DRINK, ONCE A DAY, FOR CONSTIPATION Class: Normal  
 Pharmacy: [facility] Ph #: [phone]  
 pravastatin (PRAVACHOL) 40 mg tablet 1 Sig: Take 1 Tab by mouth daily. Class: Normal  
 Pharmacy:  [address] Ph #: [phone] Route: Oral  
 tiotropium (SPIRIVA WITH HANDIHALER) 18 mcg inhalation capsule 2 Sig: USE 1 CAPSULE DAILY, VIA INHALER DEVICE Class: Normal  
 Pharmacy: [address] [place] [address] Ph #: [phone] albuterol-ipratropium (DUO-NEB) 2.5 mg-0.5 mg/3 ml nebu 1 Sig: 3 mL by Nebulization route every six (6) hours as needed. Class: Normal  
 Pharmacy:  [facility], [address] [place] [address] Ph #: [phone] Route: Nebulization Nebulizer & Compressor machine 0 Si Each by Does Not Apply route as needed. Class: Normal  
 Pharmacy:  [address] Ph #: [phone] Route: Does Not Apply  
 promethazine (PHENERGAN) 25 mg tablet 1 Sig: Take 1 Tab by mouth every six (6) hours as needed for Nausea. Class: Normal  
 Pharmacy:  [address] Ph #: [phone] Route: Oral  
  
Patient Instructions 1) Get a DMV form for a handicap tag. Complete your portion and drop form here to my attention for completion. 2) Medication refill 3) Think About Qutting!! 
? Smoking causes 480,000 deaths a year ? All tobacco products are harmful and addictive  none are safe 
? Nicotine is addictive so it is hard to quit. Medications and changes in your daily activities can double the chance of your success This is the first step to a healthier you! 
o You will feel better 
o 3 days after quitting: you can breathe easier; your blood pressure drops; food tastes better and you can smell better 
o 1 year after quitting, your risk of heart attack and stroke is cut in half! 
o You will look better 
o your breath, clothes and hair will smell better 
o your teeth will get whiter and your fingers and fingernails wont look yellow 
o You will have more money 
o if you smoke 1 pack per day, you spend $2,117 a year on cigarettes; 5 years of buying cigarettes is $10,585 up in smoke! 
o You will set a good example for your children and grandchildren Understand the reasons you may smoke o Your routine  do you smoke after eating, drinking coffee or alcohol? 
o if so, change your routine  get up from table after eating, switch to drinking tea instead of coffee 
o avoid alcohol for awhile  it lowers your chance of success 
o go to public places where smoking is not allowed  
o Your emotions  are you stressed, angry, sad, bored? 
o talk to a healthcare provider about help for depression or anxiety 
o take a walk if you are bored or angry; squeeze a tennis ball for stress 
o Your habits  do you need having something in your mouth/hands? 
o carrot sticks, sugar free hard candies, gum, toothpicks, and pretzel sticks can help. You can inhale and chew on cinnamon sticks You can do it! o Make a plan: 
o Decide on a date when you will stop smoking  write it on your calendar 
o Tell your friends and family. ? Ask them to help you by not smoking around you and help get your mind off smoking (walking, fishing, exercising are some examples) o Drink lots of water and try not to drink alcohol, coffee or other things that make you want to smoke 
o Remove ashtrays, cigarettes and other tobacco products from your home, work and car 
o People try to quit a few times before they can, so dont give up if you slip up! AFTER 20 MINUTES OF NOT SMOKING: 
Your blood pressure and heart rate decrease 
  
AFTER 24 HOURS OF NOT SMOKING: 
Your risk of heart attack decreases 
  
AFTER 48 HOURS OF NOT SMOKING: 
Your nerve endings start to regrow; your smell and taste will return;  
The carbon monoxide level in your blood may return to normal and thus make it easier for blood to carry oxygen to your tissues. 
  
AFTER 2 WEEKS OF NOT SMOKING: 
Your lung function and blood circulation improve 
  
AFTER 1 YEAR OF NOT SMOKING: 
Your risk of heart disease drops to 1/2 that of a smoker 
  
AFTER 5 YEARS OF NOT SMOKING: 
Your risk of bladder cancer may be reduced by 50% 
  
AFTER 10 YEARS OF NOT SMOKING: 
 Your risk of lung cancer is 50-70% less than the risk of a someone who is still smoking; Your risk of cancer of the mouth, throat, esophagus, kidney and pancreas may be reduced by up to 50% and your risk of ulcers decrease 
  
AFTER 15 YEARS OF NOT SMOKING: 
Your risk of heart disease, stroke and death is similar to that of someone who never smoked. 
  
  
SMOKERS WHO QUIT AT ANY AGE LIVE LONGER THAN THOSE WHO DON'T. Chronic Obstructive Pulmonary Disease (COPD): Care Instructions Your Care Instructions Chronic obstructive pulmonary disease (COPD) is a general term for a group of lung diseases, including emphysema and chronic bronchitis. People with COPD have decreased airflow in and out of the lungs, which makes it hard to breathe. The airways also can get clogged with thick mucus. Cigarette smoking is a major cause of COPD. Although there is no cure for COPD, you can slow its progress. Following your treatment plan and taking care of yourself can help you feel better and live longer. Follow-up care is a key part of your treatment and safety. Be sure to make and go to all appointments, and call your doctor if you are having problems. It's also a good idea to know your test results and keep a list of the medicines you take. How can you care for yourself at home? ?Staying healthy ? · Do not smoke. This is the most important step you can take to prevent more damage to your lungs. If you need help quitting, talk to your doctor about stop-smoking programs and medicines. These can increase your chances of quitting for good. ? · Avoid colds and flu. Get a pneumococcal vaccine shot. If you have had one before, ask your doctor whether you need a second dose. Get the flu vaccine every fall. If you must be around people with colds or the flu, wash your hands often. ? · Avoid secondhand smoke, air pollution, and high altitudes.  Also avoid cold, dry air and hot, humid air. Stay at home with your windows closed when air pollution is bad. ?Medicines and oxygen therapy ? · Take your medicines exactly as prescribed. Call your doctor if you think you are having a problem with your medicine. ? · You may be taking medicines such as: ¨ Bronchodilators. These help open your airways and make breathing easier. Bronchodilators are either short-acting (work for 6 to 9 hours) or long-acting (work for 24 hours). You inhale most bronchodilators, so they start to act quickly. Always carry your quick-relief inhaler with you in case you need it while you are away from home. ¨ Corticosteroids (prednisone, budesonide). These reduce airway inflammation. They come in pill or inhaled form. You must take these medicines every day for them to work well. ? · A spacer may help you get more inhaled medicine to your lungs. Ask your doctor or pharmacist if a spacer is right for you. If it is, ask how to use it properly. ? · Do not take any vitamins, over-the-counter medicine, or herbal products without talking to your doctor first.  
? · If your doctor prescribed antibiotics, take them as directed. Do not stop taking them just because you feel better. You need to take the full course of antibiotics. ? · Oxygen therapy boosts the amount of oxygen in your blood and helps you breathe easier. Use the flow rate your doctor has recommended, and do not change it without talking to your doctor first.  
Activity ? · Get regular exercise. Walking is an easy way to get exercise. Start out slowly, and walk a little more each day. ? · Pay attention to your breathing. You are exercising too hard if you cannot talk while you are exercising. ? · Take short rest breaks when doing household chores and other activities. ? · Learn breathing methods-such as breathing through pursed lips-to help you become less short of breath. ? · If your doctor has not set you up with a pulmonary rehabilitation program, talk to him or her about whether rehab is right for you. Rehab includes exercise programs, education about your disease and how to manage it, help with diet and other changes, and emotional support. Diet ? · Eat regular, healthy meals. Use bronchodilators about 1 hour before you eat to make it easier to eat. Eat several small meals instead of three large ones. Drink beverages at the end of the meal. Avoid foods that are hard to chew. ? · Eat foods that contain protein so that you do not lose muscle mass. ? · Talk with your doctor if you gain too much weight or if you lose weight without trying. ?Mental health ? · Talk to your family, friends, or a therapist about your feelings. It is normal to feel frightened, angry, hopeless, helpless, and even guilty. Talking openly about bad feelings can help you cope. If these feelings last, talk to your doctor. When should you call for help? Call 911 anytime you think you may need emergency care. For example, call if: 
? · You have severe trouble breathing. ?Call your doctor now or seek immediate medical care if: 
? · You have new or worse trouble breathing. ? · You cough up blood. ? · You have a fever. ? Watch closely for changes in your health, and be sure to contact your doctor if: 
? · You cough more deeply or more often, especially if you notice more mucus or a change in the color of your mucus. ? · You have new or worse swelling in your legs or belly. ? · You are not getting better as expected. Where can you learn more? Go to http://leonie-deborah.info/. Ely Irvin in the search box to learn more about \"Chronic Obstructive Pulmonary Disease (COPD): Care Instructions. \" Current as of: May 12, 2017 Content Version: 11.4 © 3525-2853 Healthwise, Incorporated.  Care instructions adapted under license by [address] (which disclaims liability or warranty for this information). If you have questions about a medical condition or this instruction, always ask your healthcare professional. [address] any warranty or liability for your use of this information. Introducing [facility]! Jaz Cayla introduces [employer] patient portal. Now you can access parts of your medical record, email your doctor's office, and request medication refills online. 1. In your internet browser, go to [email or web address] 2. Click on the First Time User? Click Here link in the Sign In box. You will see the New Member Sign Up page. 3. Enter your [employer] Access Code exactly as it appears below. You will not need to use this code after youve completed the sign-up process. If you do not sign up before the expiration date, you must request a new code. · [employer] Access Code: [email or web address] Expires: 9/25/2018 10:13 AM 
 
4. Enter the last four digits of your Social Security Number (xxxx) and Date of Birth (mm/dd/yyyy) as indicated and click Submit. You will be taken to the next sign-up page. 5. Create a [employer] ID. This will be your [employer] login ID and cannot be changed, so think of one that is secure and easy to remember. 6. Create a [employer] password. You can change your password at any time. 7. Enter your Password Reset Question and Answer. This can be used at a later time if you forget your password. 8. Enter your e-mail address. You will receive e-mail notification when new information is available in [address] 9. Click Sign Up. You can now view and download portions of your medical record. 10. Click the Download Summary menu link to download a portable copy of your medical information. If you have questions, please visit the Frequently Asked Questions section of the [employer] website.  Remember, [employer] is NOT to be used for urgent needs. For medical emergencies, dial 911. Now available from your iPhone and Android! Please provide this summary of care documentation to your next provider. Your primary care clinician is listed as Nikolas Gonzalez. If you have any questions after today's visit, please call [phone].

## 2018-07-06 RX ORDER — LISINOPRIL 20 MG/1
TABLET ORAL
Qty: 30 TAB | Refills: 0 | Status: SHIPPED | OUTPATIENT
Start: 2018-07-06 | End: 2018-07-31 | Stop reason: SDUPTHER

## 2018-07-06 NOTE — TELEPHONE ENCOUNTER
PCP: Adrianne Elliott. Sid Hercules MD    Last appt: 6/27/2018  No future appointments.     Requested Prescriptions     Pending Prescriptions Disp Refills    lisinopril (PRINIVIL, ZESTRIL) 20 mg tablet [Pharmacy Med Name: LISINOPRIL 20 MG TABLET] 30 Tab 0     Sig: TAKE ONE TABLET BY MOUTH EVERY DAY       Prior labs and Blood pressures:  BP Readings from Last 3 Encounters:   06/27/18 (!) 150/99   03/25/18 (!) 169/103   02/23/18 152/85     Lab Results   Component Value Date/Time    Sodium 144 11/08/2016 04:53 AM    Potassium 3.5 11/08/2016 04:53 AM    Chloride 114 (H) 11/08/2016 04:53 AM    CO2 23 11/08/2016 04:53 AM    Anion gap 7 11/08/2016 04:53 AM    Glucose 80 11/08/2016 04:53 AM    Glucose 81 04/08/2013 05:29 AM    BUN 6 11/08/2016 04:53 AM    Creatinine 0.59 11/08/2016 04:53 AM    BUN/Creatinine ratio 10 (L) 11/08/2016 04:53 AM    GFR est AA >60 11/08/2016 04:53 AM    GFR est non-AA >60 11/08/2016 04:53 AM    Calcium 8.1 (L) 11/08/2016 04:53 AM     Lab Results   Component Value Date/Time    Hemoglobin A1c 5.6 11/06/2016 07:04 PM     Lab Results   Component Value Date/Time    Cholesterol, total 153 11/06/2016 06:55 PM    HDL Cholesterol 43 11/06/2016 06:55 PM    LDL, calculated 93.6 11/06/2016 06:55 PM    VLDL, calculated 16.4 11/06/2016 06:55 PM    Triglyceride 82 11/06/2016 06:55 PM    CHOL/HDL Ratio 3.6 11/06/2016 06:55 PM     Lab Results   Component Value Date/Time    VITAMIN D, 25-HYDROXY 15.9 (L) 02/24/2016 03:23 PM       Lab Results   Component Value Date/Time    TSH 0.67 11/06/2016 06:55 PM

## 2018-07-31 NOTE — TELEPHONE ENCOUNTER
PCP: Kylee Humphrey MD    Last appt: 6/27/2018  No future appointments.     Requested Prescriptions     Pending Prescriptions Disp Refills    lisinopril (PRINIVIL, ZESTRIL) 20 mg tablet [Pharmacy Med Name: LISINOPRIL 20 MG TABLET] 30 Tab 0     Sig: TAKE ONE TABLET BY MOUTH EVERY DAY       Prior labs and Blood pressures:  BP Readings from Last 3 Encounters:   06/27/18 (!) 150/99   03/25/18 (!) 169/103   02/23/18 152/85     Lab Results   Component Value Date/Time    Sodium 144 11/08/2016 04:53 AM    Potassium 3.5 11/08/2016 04:53 AM    Chloride 114 (H) 11/08/2016 04:53 AM    CO2 23 11/08/2016 04:53 AM    Anion gap 7 11/08/2016 04:53 AM    Glucose 80 11/08/2016 04:53 AM    Glucose 81 04/08/2013 05:29 AM    BUN 6 11/08/2016 04:53 AM    Creatinine 0.59 11/08/2016 04:53 AM    BUN/Creatinine ratio 10 (L) 11/08/2016 04:53 AM    GFR est AA >60 11/08/2016 04:53 AM    GFR est non-AA >60 11/08/2016 04:53 AM    Calcium 8.1 (L) 11/08/2016 04:53 AM     Lab Results   Component Value Date/Time    Hemoglobin A1c 5.6 11/06/2016 07:04 PM     Lab Results   Component Value Date/Time    Cholesterol, total 153 11/06/2016 06:55 PM    HDL Cholesterol 43 11/06/2016 06:55 PM    LDL, calculated 93.6 11/06/2016 06:55 PM    VLDL, calculated 16.4 11/06/2016 06:55 PM    Triglyceride 82 11/06/2016 06:55 PM    CHOL/HDL Ratio 3.6 11/06/2016 06:55 PM     Lab Results   Component Value Date/Time    VITAMIN D, 25-HYDROXY 15.9 (L) 02/24/2016 03:23 PM       Lab Results   Component Value Date/Time    TSH 0.67 11/06/2016 06:55 PM

## 2018-08-06 ENCOUNTER — TELEPHONE (OUTPATIENT)
Dept: FAMILY MEDICINE CLINIC | Age: 53
End: 2018-08-06

## 2018-08-06 RX ORDER — LISINOPRIL 20 MG/1
TABLET ORAL
Qty: 30 TAB | Refills: 0 | Status: SHIPPED | OUTPATIENT
Start: 2018-08-06 | End: 2018-09-14 | Stop reason: SDUPTHER

## 2018-09-18 RX ORDER — LISINOPRIL 20 MG/1
TABLET ORAL
Qty: 30 TAB | Refills: 0 | Status: SHIPPED | OUTPATIENT
Start: 2018-09-18 | End: 2018-11-13 | Stop reason: SDUPTHER

## 2018-09-19 NOTE — TELEPHONE ENCOUNTER
PCP: Sridevi Heath. David Fernandez MD    Last appt: 6/27/2018  No future appointments.     Requested Prescriptions     Pending Prescriptions Disp Refills    lisinopril (PRINIVIL, ZESTRIL) 20 mg tablet [Pharmacy Med Name: LISINOPRIL 20 MG TABLET] 30 Tab 0     Sig: TAKE ONE TABLET BY MOUTH EVERY DAY       Prior labs and Blood pressures:  BP Readings from Last 3 Encounters:   06/27/18 (!) 150/99   03/25/18 (!) 169/103   02/23/18 152/85     Lab Results   Component Value Date/Time    Sodium 144 11/08/2016 04:53 AM    Potassium 3.5 11/08/2016 04:53 AM    Chloride 114 (H) 11/08/2016 04:53 AM    CO2 23 11/08/2016 04:53 AM    Anion gap 7 11/08/2016 04:53 AM    Glucose 80 11/08/2016 04:53 AM    Glucose 81 04/08/2013 05:29 AM    BUN 6 11/08/2016 04:53 AM    Creatinine 0.59 11/08/2016 04:53 AM    BUN/Creatinine ratio 10 (L) 11/08/2016 04:53 AM    GFR est AA >60 11/08/2016 04:53 AM    GFR est non-AA >60 11/08/2016 04:53 AM    Calcium 8.1 (L) 11/08/2016 04:53 AM     Lab Results   Component Value Date/Time    Hemoglobin A1c 5.6 11/06/2016 07:04 PM     Lab Results   Component Value Date/Time    Cholesterol, total 153 11/06/2016 06:55 PM    HDL Cholesterol 43 11/06/2016 06:55 PM    LDL, calculated 93.6 11/06/2016 06:55 PM    VLDL, calculated 16.4 11/06/2016 06:55 PM    Triglyceride 82 11/06/2016 06:55 PM    CHOL/HDL Ratio 3.6 11/06/2016 06:55 PM     Lab Results   Component Value Date/Time    VITAMIN D, 25-HYDROXY 15.9 (L) 02/24/2016 03:23 PM       Lab Results   Component Value Date/Time    TSH 0.67 11/06/2016 06:55 PM

## 2018-09-26 DIAGNOSIS — R11.0 NAUSEA: ICD-10-CM

## 2018-09-26 DIAGNOSIS — E78.5 HYPERLIPIDEMIA, UNSPECIFIED HYPERLIPIDEMIA TYPE: ICD-10-CM

## 2018-09-26 RX ORDER — PROMETHAZINE HYDROCHLORIDE 25 MG/1
TABLET ORAL
Qty: 30 TAB | Refills: 0 | Status: SHIPPED | OUTPATIENT
Start: 2018-09-26 | End: 2020-05-22

## 2018-09-26 RX ORDER — PRAVASTATIN SODIUM 40 MG/1
TABLET ORAL
Qty: 90 TAB | Refills: 0 | Status: SHIPPED | OUTPATIENT
Start: 2018-09-26 | End: 2018-11-13 | Stop reason: SDUPTHER

## 2018-11-13 ENCOUNTER — OFFICE VISIT (OUTPATIENT)
Dept: FAMILY MEDICINE CLINIC | Age: 53
End: 2018-11-13

## 2018-11-13 VITALS
WEIGHT: 152.9 LBS | HEART RATE: 107 BPM | OXYGEN SATURATION: 98 % | SYSTOLIC BLOOD PRESSURE: 114 MMHG | RESPIRATION RATE: 18 BRPM | HEIGHT: 63 IN | BODY MASS INDEX: 27.09 KG/M2 | DIASTOLIC BLOOD PRESSURE: 80 MMHG | TEMPERATURE: 97.4 F

## 2018-11-13 DIAGNOSIS — J44.1 CHRONIC OBSTRUCTIVE PULMONARY DISEASE WITH ACUTE EXACERBATION (HCC): ICD-10-CM

## 2018-11-13 DIAGNOSIS — R05.9 COUGH: ICD-10-CM

## 2018-11-13 DIAGNOSIS — I10 ESSENTIAL HYPERTENSION: Primary | ICD-10-CM

## 2018-11-13 DIAGNOSIS — Z00.00 LABORATORY EXAM ORDERED AS PART OF ROUTINE GENERAL MEDICAL EXAMINATION: ICD-10-CM

## 2018-11-13 DIAGNOSIS — E78.5 HYPERLIPIDEMIA, UNSPECIFIED HYPERLIPIDEMIA TYPE: ICD-10-CM

## 2018-11-13 DIAGNOSIS — K21.00 GASTROESOPHAGEAL REFLUX DISEASE WITH ESOPHAGITIS: ICD-10-CM

## 2018-11-13 RX ORDER — DULOXETIN HYDROCHLORIDE 20 MG/1
20 CAPSULE, DELAYED RELEASE ORAL DAILY
COMMUNITY

## 2018-11-13 RX ORDER — ALBUTEROL SULFATE 90 UG/1
AEROSOL, METERED RESPIRATORY (INHALATION)
Qty: 1 INHALER | Refills: 2 | Status: SHIPPED | OUTPATIENT
Start: 2018-11-13 | End: 2019-03-14 | Stop reason: SDUPTHER

## 2018-11-13 RX ORDER — FAMOTIDINE 20 MG/1
20 TABLET, FILM COATED ORAL
Qty: 30 TAB | Refills: 2 | Status: SHIPPED | OUTPATIENT
Start: 2018-11-13 | End: 2019-03-14 | Stop reason: SDUPTHER

## 2018-11-13 RX ORDER — QUETIAPINE FUMARATE 25 MG/1
TABLET, FILM COATED ORAL
Status: ON HOLD | COMMUNITY
End: 2020-10-06

## 2018-11-13 RX ORDER — PRAVASTATIN SODIUM 40 MG/1
TABLET ORAL
Qty: 90 TAB | Refills: 0 | Status: SHIPPED | OUTPATIENT
Start: 2018-11-13 | End: 2019-03-14 | Stop reason: SDUPTHER

## 2018-11-13 RX ORDER — LISINOPRIL 20 MG/1
TABLET ORAL
Qty: 90 TAB | Refills: 1 | Status: SHIPPED | OUTPATIENT
Start: 2018-11-13 | End: 2019-03-14 | Stop reason: SDUPTHER

## 2018-11-13 RX ORDER — NEBULIZER AND COMPRESSOR
1 EACH MISCELLANEOUS AS NEEDED
Qty: 1 EACH | Refills: 0 | Status: SHIPPED | OUTPATIENT
Start: 2018-11-13 | End: 2020-05-22 | Stop reason: SDUPTHER

## 2018-11-13 RX ORDER — IPRATROPIUM BROMIDE AND ALBUTEROL SULFATE 2.5; .5 MG/3ML; MG/3ML
3 SOLUTION RESPIRATORY (INHALATION)
Qty: 30 NEBULE | Refills: 1 | Status: SHIPPED | OUTPATIENT
Start: 2018-11-13 | End: 2019-03-14 | Stop reason: SDUPTHER

## 2018-11-13 RX ORDER — FLUTICASONE PROPIONATE 50 MCG
SPRAY, SUSPENSION (ML) NASAL
Qty: 16 G | Refills: 5 | Status: SHIPPED | OUTPATIENT
Start: 2018-11-13 | End: 2019-03-14 | Stop reason: SDUPTHER

## 2018-11-13 RX ORDER — DIVALPROEX SODIUM 125 MG/1
125 TABLET, DELAYED RELEASE ORAL 3 TIMES DAILY
Status: ON HOLD | COMMUNITY
End: 2020-10-06

## 2018-11-13 RX ORDER — GUAIFENESIN 600 MG/1
600 TABLET, EXTENDED RELEASE ORAL 2 TIMES DAILY
Qty: 60 TAB | Refills: 0 | Status: SHIPPED | OUTPATIENT
Start: 2018-11-13 | End: 2020-05-22

## 2018-11-13 RX ORDER — POLYETHYLENE GLYCOL 3350 17 G/17G
POWDER, FOR SOLUTION ORAL
Qty: 527 G | Refills: 0 | Status: SHIPPED | OUTPATIENT
Start: 2018-11-13 | End: 2019-03-14 | Stop reason: SDUPTHER

## 2018-11-13 NOTE — PROGRESS NOTES
S: Carol Sandoval is a 46 y.o. female who presents for medication check and refill accompanied by     Assessment/Plan:    1. Med Refill   - seeing new psych, Dr. Caleb Ramirez in [place]  -refilled HTN, GI and resp meds  -brings DMV form for handicap parking (d/t SOB); completed and returned to pt    2. Cough  -supportive tx given    3. CPE  -pt to schedule CPE and labs ordered (CBC, US, CMP, A1c, lipids)         Requesting refills for:   albuterol     Scottie Ramirez - [place]     Cough - 3 month  + hoarseness   +productive in am - unsure if there is color  No ear pain     Social history:   Physical: riding horses at farm in [address] [place] [address]: had been in [employer] this summer with relative who owns horse farm  Occupation: not working, disabled    Social History     Tobacco Use   Smoking Status Current Every Day Smoker    Packs/day: 1.00    Types: Cigarettes    Start date: 1978   Smokeless Tobacco Never Used   Tobacco Comment    1 ppd  started age 11. Working on using E-cigs to quit     Social History     Substance and Sexual Activity   Alcohol Use Yes    Comment: socially, usually 2 at a time. max 3 at a time      Social History     Substance and Sexual Activity   Drug Use No    Comment: remote h/o drug use in early 2000s - cocaine, MJ       Review of Systems:  - Constitutional Symptoms: no fevers, chills  - Cardiovascular: no chest pain or palpitations  - Respiratory: no cough or shortness of breath  - Gastrointestinal: no dysphagia or abdominal pain  ROS is negative otherwise.     PHQ over the last two weeks 11/13/2018   Little interest or pleasure in doing things Not at all   Feeling down, depressed, irritable, or hopeless Not at all   Total Score PHQ 2 0   Trouble falling or staying asleep, or sleeping too much Not at all   Feeling tired or having little energy Several days   Poor appetite, weight loss, or overeating Several days   Feeling bad about yourself - or that you are a failure or have let yourself or your family down Several days   Trouble concentrating on things such as school, work, reading, or watching TV Not at all   Moving or speaking so slowly that other people could have noticed; or the opposite being so fidgety that others notice Several days   Thoughts of being better off dead, or hurting yourself in some way Not at all   PHQ 9 Score 4       I reviewed the following:  Current Outpatient Medications   Medication Sig Dispense Refill    QUEtiapine (SEROQUEL) 25 mg tablet Take  by mouth.  DULoxetine (CYMBALTA) 20 mg capsule Take 20 mg by mouth daily.  divalproex DR (DEPAKOTE) 125 mg tablet Take 125 mg by mouth three (3) times daily.  promethazine (PHENERGAN) 25 mg tablet TAKE ONE TABLET BY MOUTH EVERY 6 HOURS AS NEEDED FOR NAUSEA 30 Tab 0    pravastatin (PRAVACHOL) 40 mg tablet TAKE ONE TABLET BY MOUTH EVERY DAY 90 Tab 0    lisinopril (PRINIVIL, ZESTRIL) 20 mg tablet TAKE ONE TABLET BY MOUTH EVERY DAY 30 Tab 0    albuterol (VENTOLIN HFA) 90 mcg/actuation inhaler 2 PUFFS EVERY 4 HOURS AS NEEDED FOR WHEEZING OR SHORTNESS OF BREATH 1 Inhaler 2    famotidine (PEPCID AC) 20 mg tablet Take 1 Tab by mouth nightly. 30 Tab 2    fluticasone (FLONASE) 50 mcg/actuation nasal spray USE 2 SPRAYS IN EACH NOSTRIL EVERY DAY 16 g 5    hydrOXYzine HCl (ATARAX) 25 mg tablet Take 1 Tab by mouth every six (6) hours as needed for Itching. Indications: anxiety 60 Tab 3    polyethylene glycol (MIRALAX) 17 gram/dose powder MIX 17 GM (FILL CAP FULL WITH POWDER) WITH 8OZ OF WATER, AND DRINK, ONCE A DAY, FOR CONSTIPATION 527 g 0    tiotropium (SPIRIVA WITH HANDIHALER) 18 mcg inhalation capsule USE 1 CAPSULE DAILY, VIA INHALER DEVICE 30 Cap 2    albuterol-ipratropium (DUO-NEB) 2.5 mg-0.5 mg/3 ml nebu 3 mL by Nebulization route every six (6) hours as needed. 30 Nebule 1    Nebulizer & Compressor machine 1 Each by Does Not Apply route as needed.  1 Each 0    cyclobenzaprine (FLEXERIL) 10 mg tablet Take 1 Tab by mouth three (3) times daily (with meals). 20 Tab 0    diclofenac EC (VOLTAREN) 75 mg EC tablet Take 1 Tab by mouth two (2) times a day. With food. 20 Tab 0    loratadine-pseudoephedrine (LORATADINE-D) 5-120 mg per tablet TAKE 1 TABLET EVERY 12 HOURS AS NEEDED (CONGESTION/ALLERGY) 30 Tab 2    methylPREDNISolone (MEDROL, WILNER,) 4 mg tablet Take as directed on dosage pack 21 Tab 0       Past Medical History:   Diagnosis Date    Anxiety     Arthritis     Bipolar disorder (HCC)     bi polar Dr. Jocy Reyes has dismissed her as a pt    Cocaine abuse ([facility] 10/12    Based on ER urine tox screen    COPD     Dissociative identity disorder [facility])     Drug overdose, intentional ([facility]     Drug-induced delirium ([facility] 11/11/2016    Fibromyalgia 6/2007    Rheum Pansy Freeze p [phone], f [phone]    Fracture of lumbar spine ([address]     Found on MRI    Hyperlipidemia 9/29/2011    Hypertension     Migraines     Multiple personality disorder ([address]     Neuropathy     Radicular neuropathy     Dr[facility] Jeanie Kurtz at [facility] Neck and back.  Suicidal ideations     Suicidal overdose ([address] 11/6/2016    Tobacco abuse     Vitamin D deficiency        Allergies   Allergen Reactions    Vicodin [Hydrocodone-Acetaminophen] Itching       O: VS:   Visit Vitals  /80 (BP 1 Location: Left arm, BP Patient Position: Sitting)   Pulse (!) 107   Temp 97.4 °F (36.3 °C) (Oral)   Resp 18   Ht 5' 3\" (1.6 m)   Wt 152 lb 14.4 oz (69.4 kg)   LMP 03/01/2015   SpO2 98%   BMI 27.09 kg/m²      GENERAL: Sherri Morel is in no acute distress. Non-toxic. Well nourished. Well developed. Appropriately groomed. HEAD:  Normocephalic. Atraumatic. EYE: PERRLA. RESP: Breath sounds are symmetrical bilaterally. Unlabored without SOB. Speaking in full sentences. Clear to auscultation bilaterally anteriorly and posteriorly. No wheezes. No rales or rhonchi. CV: normal rate. Regular rhythm. S1, S2 audible.   No murmur noted.  No rubs, clicks or gallops noted. PSYCH: appropriate behavior, dress and thought processes. Good eye contact. Clear and coherent speech. Full affect. Good insight.   _______________________________________________________________  Patient education was done. Advised on nutrition, physical activity, weight management, and safety. Counseling included discussion of diagnosis, differentials, treatment options, prescribed treatment, warning signs and follow up. Medication risks/benefits,interactions and alternatives discussed with patient.      Patient verbalized understanding and agreed to plan of care. Patient was given an after visit summary which included current diagnoses, medications and vital signs. Follow up as directed.

## 2018-11-13 NOTE — PROGRESS NOTES
Judson Law  Identified pt with two pt identifiers(name and ). Chief Complaint   Patient presents with    Medication Evaluation     rm 11/non fasting/ handicap form filled out       1. Have you been to the ER, urgent care clinic since your last visit? Hospitalized since your last visit? no    2. Have you seen or consulted any other health care providers outside of the [address] [place] [address] since your last visit? Include any pap smears or colon screening. no      Advance Care Planning    In the event something were to happen to you and you were unable to speak on your behalf, do you have an Advance Directive/ Living Will in place stating your wishes? NO    If yes, do we have a copy on file NO    If no, would you like information NO    Medication reconciliation up to date and corrected with patient at this time. Today's provider has been notified of reason for visit, vitals and flowsheets obtained on patients. Reviewed record in preparation for visit, huddled with provider and have obtained necessary documentation. Health Maintenance Due   Topic    DTaP/Tdap/Td series (1 - Tdap)    PAP AKA CERVICAL CYTOLOGY     Shingrix Vaccine Age 50> (1 of 2)    BREAST CANCER SCRN MAMMOGRAM     MEDICARE YEARLY EXAM     Influenza Age 5 to Adult        Wt Readings from Last 3 Encounters:   18 152 lb 3.2 oz (69 kg)   18 153 lb 3.5 oz (69.5 kg)   18 152 lb 8.9 oz (69.2 kg)     Temp Readings from Last 3 Encounters:   18 97.5 °F (36.4 °C) (Oral)   18 98.4 °F (36.9 °C)   18 99.2 °F (37.3 °C)     BP Readings from Last 3 Encounters:   18 (!) 150/99   18 (!) 169/103   18 152/85     Pulse Readings from Last 3 Encounters:   18 83   18 96   18 78     There were no vitals filed for this visit.       Learning Assessment:  :     Learning Assessment 2017   PRIMARY LEARNER Patient Patient   HIGHEST LEVEL OF EDUCATION - PRIMARY LEARNER  SOME COLLEGE SOME COLLEGE   BARRIERS PRIMARY LEARNER NONE COGNITIVE   CO-LEARNER CAREGIVER - No   PRIMARY LANGUAGE ENGLISH ENGLISH   LEARNER PREFERENCE PRIMARY DEMONSTRATION DEMONSTRATION   LEARNING SPECIAL TOPICS - No   ANSWERED BY patient Patient   RELATIONSHIP SELF SELF       Depression Screening:  :     No flowsheet data found. No flowsheet data found. Fall Risk Assessment:  :     No flowsheet data found. Abuse Screening:  :     No flowsheet data found. ADL Screening:  :     ADL Assessment 11/14/2017   Feeding yourself No Help Needed   Getting from bed to chair No Help Needed   Getting dressed No Help Needed   Bathing or showering No Help Needed   Walk across the room (includes cane/walker) No Help Needed   Using the telphone No Help Needed   Taking your medications No Help Needed   Preparing meals No Help Needed   Managing money (expenses/bills) No Help Needed   Moderately strenuous housework (laundry) No Help Needed   Shopping for personal items (toiletries/medicines) No Help Needed   Shopping for groceries No Help Needed   Driving No Help Needed   Climbing a flight of stairs No Help Needed   Getting to places beyond walking distances No Help Needed           BMI:  Weight Metrics 6/27/2018 3/25/2018 2/23/2018 11/14/2017 11/7/2017 11/6/2016 10/20/2016   Weight 152 lb 3.2 oz 153 lb 3.5 oz 152 lb 8.9 oz 166 lb 3.2 oz 163 lb 158 lb 4.6 oz 160 lb 6.4 oz   BMI 26.96 kg/m2 27.14 kg/m2 27.02 kg/m2 30.4 kg/m2 29.81 kg/m2 28.49 kg/m2 28.87 kg/m2   Some encounter information is confidential and restricted. Go to Review Flowsheets activity to see all data. Medication reconciliation up to date and corrected with patient at this time.

## 2018-11-13 NOTE — PATIENT INSTRUCTIONS
1) An upper respiratory infection (URI) is an infection of the throat and nose. It is also known as \"the common cold\". 90% of these infections are caused by a virus. Viral infections do not respond to antibiotic treatment, only bacteria are killed by antibiotics. Therefore, supportive treatment is recommended to help with symptoms. Symptoms usually subside after 7-10 days (or longer if you smoke). If symptoms worsen or persist after 14 days, or if you have fever over 101.3, fever for 5 days or more, wheezing, or shortness of breath, please contact the office. To prevent URIs, wash hands frequently (epecially before and after eating - use hand  if you are in a public place.) Eat a healthy diet, get regular exercise and sufficient sleep. Reduce your exposure to cigarette smoke. If you need to cough or sneeze, use the crook of your arm to cover your mouth. Supportive Care    1) Alternate 400mg Ibuprofen and 650mg Tylenol every 4 hours as needed for sinus pain and discomfort. Make sure to take Ibuprofen with food as it can cause stomach upset. Do not exceed 3000 mg Tylenol per day. 2) Take a daily antihistamine to reduce symptoms such as sneezing, runny nose and itchy eyes. You can buy these over the counter (OTC) (no prescription needed) such as Claritin, Allegra or Zyrtec. Take this daily as it takes 3-4 weeks for the full therapeutic effect to take place. Follow directions on package. If symptoms of sneezing, coughing and runny nose are severe, you can try taking Benadryl at night before bed. However, Benadryl can cause drowsiness, so please use caution. Elderly people should not use Benadryl due to side effects and increase risk of falling. 3) OTC Robitussin or Delsym for cough relief. Follow directions on package. Do not exceed maximum dose. May cause drowsiness.   If you have high blood pressure or kidney problems, avoid cough medicines that contain decongestants -- such as pseudoephedrine, ephedrine, phenylephrine, naphazoline and oxymetazoline. 4) Use an OTC decongestant nasal spray such as Flonase, Nasonex, or Rhinocort. These contain a steroid and will help reduce congestion. You can spray 2x in each nostril two times a day for 3-5 days. Use the opposite hand of the nostril you are spraying and look down at your toes when you administer the nasal spray. This ensures the spray is applied to the nasal tissue properly. If you use Afrin for nasal congestion, DO NOT use for more than 5 days (due to rebound congestion)     Saline nasal spray can be used daily and will help restore moisture to dry nasal passages, wash away allergens and can help prevent inflammation of the mucous membranes. 5) Mucinex (guaifenesin) helps thin mucus and may make it easier to clear it from head, throat and lungs. 6) Increase your fluid consumption, especially water. Eat a well-balanced diet and avoid dairy and sugar as these can increase or thicken congestion. 7) Warm salt water gargle can help alleviate sore throat (1 teaspoon in 8 oz warm water)    8) Honey is a natural cough suppressor - can take a teaspoon of honey for cough or mix with hot tea. Local honey can help inoculate against local pollen that causes allergic reactions. (It has to be local honey from our area. You can usually find local honey at farmers' markets.)     9) Humidify air, especially in bedroom at night, as it may help with nasal and throat dryness. Breathing in steam from a shower may help loosen mucus and soothe an irritated throat. 10) Get plenty of rest!    11) A warm soak in a bath can help ease muscle and body aches. Add 2 cup of epsom salt to water (Dr. Vikram Pena is a good one- at [facility] for around $6). Or you can put 1 cup of epsom salt in quart of warm water, soak a wash cloth in solution and apply to sore muscles.      12) Emergen C or [place] - vitamin supplements containing high doses of vitamin C, Vitamin B12 and B6 that can is marketed to help prevent or stop colds (although this has not been confirmed by scientific research)     If symptoms persist for more than 10 days or if you have a fever above 102, please call the office. Complications of URI include an infection of the skin around the eye and other infections. Watch for signs of fever, chills, body aches or any areas of red, warm, swollen and tender skin. Call immediately if you notice these signs. 2) Please check your blood pressure through the week at staggered times in the day. (If you check in the morning, it should be at least one hour after your morning blood pressure medications.)      Arm monitors are most accurate. If you use a wrist monitor, make sure your wrist is at heart level. You can bring your home monitor to your next visit and have it calibrated with the machine in the office to gauge your readings. Sit  with your feet uncrossed and relax for 5 minutes before taking your BP. Keep a written record of your blood pressure readings and bring it to each appointment. If your systolic (top) blood pressure is consistently greater than 140mmHg or less than [id number] of the diastolic (bottom) number is consistently greater than 90mmHg or less than 60mmHg then please schedule an office appointment. Cardiac symptoms that would need immediate attention include: uncomfortable pressure, squeezing, fullness or pain in the center of your chest. Pain or discomfort in one or both arms, the back, neck, jaw or stomach. Shortness of breath with or without chest discomfort, breaking out in a cold sweat, nausea or lightheadedness. 3) Refill on medications, completed and returned DMV form to pt    4) Labs - Make a lab only appt and get them done when you are fasting for 8 hours. Also make appt for complete physical 7 days afterwards and we will discuss results of labs then.      Complete Blood Count (CBC) helps evaluate your overall health, including hemoglobin and red blood cells that carry oxygen, white blood cells that fight infection and platelets that help with clotting. Complete Metabolic Panel (CMP) assesses electrolytes, kidney and liver function.  (A Basic Metabolic Panel (BMP) does not include liver function.)  Electrolytes include sodium, potassium, calcium, and chloride. These are necessary for cell function and an imbalance can cause serious problems. BUN and creatinine are markers of kidney function. ALT and AST are markers of liver function. Hemoglobin A1c is a 3 month average of your blood sugar and used as a marker of your diabetes control. A normal value is less than 5.7%. Total Cholesterol is the total number of cholesterol particles in your blood, which includes triglycerides, HDL and LDL. A small amount of cholesterol is needed for the body's cells, hormones, and metabolism. Goal is less than 200. Triglycerides are the short term fats in your blood which are used for energy. Goal is less than 150. High Density Lipids (HDL) is the \"good\" cholesterol in your blood. HDL helps remove bad cholesterol from your blood. Goal is more than 40. Low Density Lipids (LDL) is the \"bad\" cholesterol in your blood. LDL can stick to your arteries, raising the risk for heart attack and stroke. Goal is less than 100    Urinalysis - this is a test of your urine to check your overall health. It is recommended as a part of a routine check up and screens for a variety of disorders, such as urinary tract infections, kidney disease and diabetes. Bronchitis: Care Instructions  Your Care Instructions    Bronchitis is inflammation of the bronchial tubes, which carry air to the lungs. The tubes swell and produce mucus, or phlegm. The mucus and inflamed bronchial tubes make you cough. You may have trouble breathing.   Most cases of bronchitis are caused by viruses like those that cause colds. Antibiotics usually do not help and they may be harmful. Bronchitis usually develops rapidly and lasts about 2 to 3 weeks in otherwise healthy people. Follow-up care is a key part of your treatment and safety. Be sure to make and go to all appointments, and call your doctor if you are having problems. It's also a good idea to know your test results and keep a list of the medicines you take. How can you care for yourself at home? · Take all medicines exactly as prescribed. Call your doctor if you think you are having a problem with your medicine. · Get some extra rest.  · Take an over-the-counter pain medicine, such as acetaminophen (Tylenol), ibuprofen (Advil, Motrin), or naproxen (Aleve) to reduce fever and relieve body aches. Read and follow all instructions on the label. · Do not take two or more pain medicines at the same time unless the doctor told you to. Many pain medicines have acetaminophen, which is Tylenol. Too much acetaminophen (Tylenol) can be harmful. · Take an over-the-counter cough medicine that contains dextromethorphan to help quiet a dry, hacking cough so that you can sleep. Avoid cough medicines that have more than one active ingredient. Read and follow all instructions on the label. · Breathe moist air from a humidifier, hot shower, or sink filled with hot water. The heat and moisture will thin mucus so you can cough it out. · Do not smoke. Smoking can make bronchitis worse. If you need help quitting, talk to your doctor about stop-smoking programs and medicines. These can increase your chances of quitting for good. When should you call for help? Call 911 anytime you think you may need emergency care.  For example, call if:    · You have severe trouble breathing.    Call your doctor now or seek immediate medical care if:    · You have new or worse trouble breathing.     · You cough up dark brown or bloody mucus (sputum).     · You have a new or higher fever.     · You have a new rash.    Watch closely for changes in your health, and be sure to contact your doctor if:    · You cough more deeply or more often, especially if you notice more mucus or a change in the color of your mucus.     · You are not getting better as expected. Where can you learn more? Go to http://leonie-deborah.info/. Enter H333 in the search box to learn more about \"Bronchitis: Care Instructions. \"  Current as of: December 6, 2017  Content Version: 11.8  © 8230-6892 [employer]. Care instructions adapted under license by [employer] (which disclaims liability or warranty for this information). If you have questions about a medical condition or this instruction, always ask your healthcare professional. [address] any warranty or liability for your use of this information.

## 2018-11-15 RX ORDER — MELATONIN
2000 DAILY
Qty: 180 TAB | Refills: 1 | Status: SHIPPED | OUTPATIENT
Start: 2018-11-15 | End: 2019-07-17 | Stop reason: SDUPTHER

## 2019-01-15 ENCOUNTER — OFFICE VISIT (OUTPATIENT)
Dept: FAMILY MEDICINE CLINIC | Age: 54
End: 2019-01-15

## 2019-01-15 VITALS
HEART RATE: 97 BPM | TEMPERATURE: 97.7 F | WEIGHT: 159.7 LBS | SYSTOLIC BLOOD PRESSURE: 139 MMHG | HEIGHT: 63 IN | RESPIRATION RATE: 18 BRPM | OXYGEN SATURATION: 99 % | DIASTOLIC BLOOD PRESSURE: 93 MMHG | BODY MASS INDEX: 28.3 KG/M2

## 2019-01-15 DIAGNOSIS — R05.9 COUGH: ICD-10-CM

## 2019-01-15 DIAGNOSIS — F41.9 ANXIETY: ICD-10-CM

## 2019-01-15 DIAGNOSIS — Z72.0 TOBACCO ABUSE: ICD-10-CM

## 2019-01-15 DIAGNOSIS — J06.9 UPPER RESPIRATORY TRACT INFECTION, UNSPECIFIED TYPE: ICD-10-CM

## 2019-01-15 DIAGNOSIS — I10 ESSENTIAL HYPERTENSION: ICD-10-CM

## 2019-01-15 DIAGNOSIS — Z12.39 SCREENING FOR BREAST CANCER: ICD-10-CM

## 2019-01-15 DIAGNOSIS — Z00.00 WELL WOMAN EXAM (NO GYNECOLOGICAL EXAM): Primary | ICD-10-CM

## 2019-01-15 RX ORDER — BENZONATATE 200 MG/1
200 CAPSULE ORAL
Qty: 30 CAP | Refills: 0 | Status: SHIPPED | OUTPATIENT
Start: 2019-01-15 | End: 2019-01-22

## 2019-01-15 RX ORDER — AMOXICILLIN 500 MG/1
500 CAPSULE ORAL 2 TIMES DAILY
Qty: 20 CAP | Refills: 0 | Status: SHIPPED | OUTPATIENT
Start: 2019-01-15 | End: 2019-01-25

## 2019-01-15 NOTE — PROGRESS NOTES
Marlena Alvarez  Identified pt with two pt identifiers(name and ). Chief Complaint   Patient presents with    Complete Physical     rm 11/non fasting       1. Have you been to the ER, urgent care clinic since your last visit? no  Hospitalized since your last visit? no    2. Have you seen or consulted any other health care providers outside of the [address] [place] [address] since your last visit? Include any pap smears or colon screening. no      Advance Care Planning    In the event something were to happen to you and you were unable to speak on your behalf, do you have an Advance Directive/ Living Will in place stating your wishes? NO    If yes, do we have a copy on file NO    If no, would you like information NO    Medication reconciliation up to date and corrected with patient at this time. Today's provider has been notified of reason for visit, vitals and flowsheets obtained on patients. Reviewed record in preparation for visit, huddled with provider and have obtained necessary documentation. Health Maintenance Due   Topic    DTaP/Tdap/Td series (1 - Tdap)    PAP AKA CERVICAL CYTOLOGY     Shingrix Vaccine Age 50> (1 of 2)    BREAST CANCER SCRN MAMMOGRAM     MEDICARE YEARLY EXAM     Influenza Age 5 to Adult        Wt Readings from Last 3 Encounters:   18 152 lb 14.4 oz (69.4 kg)   18 152 lb 3.2 oz (69 kg)   18 153 lb 3.5 oz (69.5 kg)     Temp Readings from Last 3 Encounters:   18 97.4 °F (36.3 °C) (Oral)   18 97.5 °F (36.4 °C) (Oral)   18 98.4 °F (36.9 °C)     BP Readings from Last 3 Encounters:   18 114/80   18 (!) 150/99   18 (!) 169/103     Pulse Readings from Last 3 Encounters:   18 (!) 107   18 83   18 96     There were no vitals filed for this visit.       Learning Assessment:  :     Learning Assessment 2018   PRIMARY LEARNER Patient Patient Patient   HIGHEST LEVEL OF EDUCATION - PRIMARY LEARNER  SOME COLLEGE SOME COLLEGE SOME COLLEGE   BARRIERS PRIMARY LEARNER - NONE COGNITIVE   CO-LEARNER CAREGIVER - - No   PRIMARY LANGUAGE ENGLISH ENGLISH ENGLISH   LEARNER PREFERENCE PRIMARY LISTENING DEMONSTRATION DEMONSTRATION   LEARNING SPECIAL TOPICS - - No   ANSWERED BY Patient patient Patient   RELATIONSHIP SELF SELF SELF       Depression Screening:  :     PHQ over the last two weeks 11/13/2018   Little interest or pleasure in doing things Not at all   Feeling down, depressed, irritable, or hopeless Not at all   Total Score PHQ 2 0   Trouble falling or staying asleep, or sleeping too much Not at all   Feeling tired or having little energy Several days   Poor appetite, weight loss, or overeating Several days   Feeling bad about yourself - or that you are a failure or have let yourself or your family down Several days   Trouble concentrating on things such as school, work, reading, or watching TV Not at all   Moving or speaking so slowly that other people could have noticed; or the opposite being so fidgety that others notice Several days   Thoughts of being better off dead, or hurting yourself in some way Not at all   PHQ 9 Score 4       No flowsheet data found. Fall Risk Assessment:  :     No flowsheet data found. Abuse Screening:  :     Abuse Screening Questionnaire 11/13/2018   Do you ever feel afraid of your partner? N   Are you in a relationship with someone who physically or mentally threatens you? N   Is it safe for you to go home?  Y       ADL Screening:  :     ADL Assessment 11/14/2017   Feeding yourself No Help Needed   Getting from bed to chair No Help Needed   Getting dressed No Help Needed   Bathing or showering No Help Needed   Walk across the room (includes cane/walker) No Help Needed   Using the telphone No Help Needed   Taking your medications No Help Needed   Preparing meals No Help Needed   Managing money (expenses/bills) No Help Needed   Moderately strenuous housework (laundry) No Help Needed   Shopping for personal items (toiletries/medicines) No Help Needed   Shopping for groceries No Help Needed   Driving No Help Needed   Climbing a flight of stairs No Help Needed   Getting to places beyond walking distances No Help Needed           BMI:  Weight Metrics 11/13/2018 6/27/2018 3/25/2018 2/23/2018 11/14/2017 11/7/2017 11/6/2016   Weight 152 lb 14.4 oz 152 lb 3.2 oz 153 lb 3.5 oz 152 lb 8.9 oz 166 lb 3.2 oz 163 lb 158 lb 4.6 oz   BMI 27.09 kg/m2 26.96 kg/m2 27.14 kg/m2 27.02 kg/m2 30.4 kg/m2 29.81 kg/m2 28.49 kg/m2   Some encounter information is confidential and restricted. Go to Review Flowsheets activity to see all data. Medication reconciliation up to date and corrected with patient at this time.

## 2019-01-15 NOTE — PATIENT INSTRUCTIONS
1) Healthy Weight  Body Mass Index is a noninvasive way to screen for weight and body fat. This is a mathematical calculation based on your height and weight. A healthy BMI is between 20% -24.9%. Your BMI is 28 %. There is a relationship between high BMI and various healthy problems, such as osteoarthritis, muscle pain, increased risk of cancer, diabetes, heart disease, stroke, hypertension, high cholesterol, sleep apnea, breathing problems, depression, which is why a healthy BMI is so important. In terms of weight loss, a 5-10% reduction in body weight over 3-6 months is a reasonable goal.  There would likely be improvements in risk for disease such as diabetes and heart disease, with this amount of weight loss. In order to lose weight, try reducing your daily intake of calories by decreasing portion size of food and increase exercise to help reduce weight. Eat 3-5 small meals per day instead of 3 large meals. Choose lean meats for protein source which include chicken, pork, and turkey. The recommended serving size for protein is a 2-3 oz serving (the size of your fist), and 1-1.5 oz of carbohydrate per meal (about 1 cup). Increase servings of fruits and vegetables. Limit processed carbohydrates, (i.e. most breads, crackers, pasta, chips, rice, breaded or battered food, etc). If you choose to eat carbohydrates, whole wheat, (instead of white) is a healthier option for bread, rice, and crackers. Avoid fried foods. Limit sugar. Do your best to avoid all sweetened beverages, such as alcohol, juice, sodas or sweet teas, drink water, unsweet tea, diet soda, or crystal light as options instead. (Don't drink more than 2 of the 12oz artificially sweetened drinks per day as these can increase hunger and make it more difficult to lose weight. The daily goal for water intake should be at least 64 oz/day for most people. Fair Life milk is a healthy choice in milk products.  It is double filtered to remove much of the lactose (sugar found in milk) and recommended by trainers and nutritionists. There is 13 g of protein in a serving, so it is an easy way to increase your protein and calcium intake. Daily exercise will also help with weight loss and overall health. A minimum of 150 minutes a week of moderate exercise is recommended (30 minutes per day). Make exercise a routine part of your day - for example, park in spaces far away from [facility], take stairs instead of elevator, if sitting for long periods, get up from chair and walk every hour. Recruit a friend or relative to exercise with you and keep you on schedule. It is much easier to exercise with a finesse who will make sure you work out each day! Home DVDs can be a great way to work out, if you will do them (otherwise, they aren't worth the money!) [employer] is a eight week mixed martial arts (MMA) based workout. It has 5 main workout DVDs, each one is 45 minutes consisting of a 10 minute warm-up, five 5 minute workouts and a 6 minute stretching/cool down. It is easy to follow, with options to modify each move and you only need hand weights and some space to do the work out. Meal planning smart phone applications like [employer] can help plan healthy meals. Get 7-8 hours of sleep each night. For reliable dietary information, go to www. EATRIGHT.org.    You may wish to consider seeing the nutritionist at [facility] ([phone]), [facility] ([phone]) or [facility] ([phone]). Free smart phone application to help manage weight loss: MyFitnessPal = tracks food intake, exercise and weight. Daily nutritional summary. Meal      2) Labs  The following blood work was ordered today. Once the tests are completed, you will receive a letter, email or phone call with the results. If you have not heard from us within 10-14 days, please call the office for the results.     Complete Blood Count (CBC) helps evaluate your overall health, including hemoglobin and red blood cells that carry oxygen, white blood cells that fight infection and platelets that help with clotting. Complete Metabolic Panel (CMP) assesses electrolytes, kidney and liver function.  (A Basic Metabolic Panel (BMP) does not include liver function.)  Electrolytes include sodium, potassium, calcium, and chloride. These are necessary for cell function and an imbalance can cause serious problems. BUN and creatinine are markers of kidney function. ALT and AST are markers of liver function. Hemoglobin A1c is a 3 month average of your blood sugar and used as a marker of your diabetes control. A normal value is less than 5.7%. Total Cholesterol is the total number of cholesterol particles in your blood, which includes triglycerides, HDL and LDL. A small amount of cholesterol is needed for the body's cells, hormones, and metabolism. Goal is less than 200. Triglycerides are the short term fats in your blood which are used for energy. Goal is less than 150. High Density Lipids (HDL) is the \"good\" cholesterol in your blood. HDL helps remove bad cholesterol from your blood. Goal is more than 40. Low Density Lipids (LDL) is the \"bad\" cholesterol in your blood. LDL can stick to your arteries, raising the risk for heart attack and stroke. Goal is less than 100    Urinalysis - this is a test of your urine to check your overall health. It is recommended as a part of a routine check up and screens for a variety of disorders, such as urinary tract infections, kidney disease and diabetes. Your thyroid is responsible for secreting hormones and regulating your metabolism. Thyroid Stimulating Hormone (TSH) is a test for thyroid function. TSH is a hormone made by the pituitary gland in the brain and tells your thyroid gland to make hormones and release them into your blood.  If your thyroid isn't producing enough hormone, you may have symptoms such as unexplained weight gain, fatigue, hair loss. If your thyroid is producing too much hormone, you may have symptoms of diarrhea, heart palpitations, fatigue, unexplained weight loss. A normal TSH value is between 0.45 - 4.5. 3) Please take the amoxicillin 500mg twice a day for 10 days . This is an antibiotic and you should take all of it as directed until it is complete, even if you are feeling better. This prevents bacterial resistance. (However, if you have any kind of reaction - itching, shortness of breath, etc, please stop the antibiotic immediately and call the office.)  Please throw away your toothbrush (or put it through  cycle) after 24 hours of starting the antibiotics. You should no longer be contagious after taking antibiotics for 24 hours. Eating healthy, drinking enough fluids (especially water) and getting enough sleep (8hrs) are also important to help you heal.     Please use good handwashing technique and make sure you wash hands before and after eating. Use hand  if you are in a public place. 4)   Shingles Vaccine - Shingrix  Herpes Zoster (Shingles)     Herpes zoster (shingles) is a painful rash caused by the same virus that causes chickenpox. After an episode of chickenpox, the virus retreats to cells of the nervous system, where it can reside quietly for decades. However, later in life, the varicella-zoster virus can become active again. When it reactivates, it causes shingles. Shingles can affect people of all ages. It is particularly common in adults over age 48 years. It is also more common in individuals of all ages with conditions that weaken the immune system. Pain is usually the first sign of shingles. Other symptoms include: fever, chills, headache, numbness, tingling and/or burning pain and a skin rash. The rash can appear anywhere on your body, but most commonly on the torso.   It is usually on only 1 side of your body, in a band or beltlike pattern. During the first several days, small, painful blisters appear in the area. Scarring may occur where the blisters appear and there may be continued sensitivity and pain in that patch of skin for months after the infection. Treatment:   There is no cure for shingles - it is usually self-limiting. However, anti-viral medications can reduce the spread of the rash, speed healing and decrease the risk of complications. Supportive Treatment:   1)  Tylenol or ibuprofen can be used to reduce pain  2) Colloidal oatmeal bath or wet cool compresses may help with itching. Make a solution of cool water and cornstarch, baking soda, or Aveeno Oatmeal.  Apply the solution as a compress to the area. This will soothe the skin. 3) Wash the skin with soap and water to keep rash free of infection. 4) Reduce stress - exercise, yoga, healthy diet      THE BLISTER FLUID IS CONTAGIOUS TO ANYONE WHO HAS NOT ALREADY HAD CHICKEN POX. Stay home from work or school until all blisters have formed a crust (usually 2 weeks after the illness begins) and you are no longer contagious. Prevention:  The CDC recommends everyone over the age of 61 receive the shingles vaccine. This is recommended for people who have already had a shingles outbreak as it could prevent future occurrences of the disease. According to the CDC, it is also recommended for people born before [birth year] in the [address] [place] [address] who have not had varicella (chicken pox) as they are considered immune.      [facility] is a vaccine indicated for prevention of herpes zoster (shingles) in adults aged 48 years and older and is the preferred vaccine for preventing shingles and related complications    The Center for Disease Control and Prevention recommended Shingrix for the following:  - healthy adults aged 48 years and older to prevent shingles and related complications  - adults who previously received the current shingles vaccine (Zostavax®) to prevent shingles and related complications    Two doses (0.5 mL each) are needed for full efficacy. The vaccines are administered intramuscularly, with the second one administered 2-6 months after the initial vaccine. 5) An upper respiratory infection (URI) is an infection of the throat and nose. It is also known as \"the common cold\". 90% of these infections are caused by a virus. Viral infections do not respond to antibiotic treatment, only bacteria are killed by antibiotics. Therefore, supportive treatment is recommended to help with symptoms. Symptoms usually subside after 7-10 days (or longer if you smoke). If symptoms worsen or persist after 14 days, or if you have fever over 101.3, fever for 5 days or more, wheezing, or shortness of breath, please contact the office. To prevent URIs, wash hands frequently (epecially before and after eating - use hand  if you are in a public place.) Eat a healthy diet, get regular exercise and sufficient sleep. Reduce your exposure to cigarette smoke. If you need to cough or sneeze, use the crook of your arm to cover your mouth. Supportive Care    1) Alternate 400mg Ibuprofen and 650mg Tylenol every 4 hours as needed for sinus pain and discomfort. Make sure to take Ibuprofen with food as it can cause stomach upset. Do not exceed 3000 mg Tylenol per day. 2) Take a daily antihistamine to reduce symptoms such as sneezing, runny nose and itchy eyes. You can buy these over the counter (OTC) (no prescription needed) such as Claritin, Allegra or Zyrtec. Take this daily as it takes 3-4 weeks for the full therapeutic effect to take place. Follow directions on package. If symptoms of sneezing, coughing and runny nose are severe, you can try taking Benadryl at night before bed. However, Benadryl can cause drowsiness, so please use caution. Elderly people should not use Benadryl due to side effects and increase risk of falling.     3) OTC Robitussin or Delsym for cough relief. Follow directions on package. Do not exceed maximum dose. May cause drowsiness. If you have high blood pressure or kidney problems, avoid cough medicines that contain decongestants -- such as pseudoephedrine, ephedrine, phenylephrine, naphazoline and oxymetazoline. 4) Use an OTC decongestant nasal spray such as Flonase, Nasonex, or Rhinocort. These contain a steroid and will help reduce congestion. You can spray 2x in each nostril two times a day for 3-5 days. Use the opposite hand of the nostril you are spraying and look down at your toes when you administer the nasal spray. This ensures the spray is applied to the nasal tissue properly. If you use Afrin for nasal congestion, DO NOT use for more than 5 days (due to rebound congestion)     Saline nasal spray can be used daily and will help restore moisture to dry nasal passages, wash away allergens and can help prevent inflammation of the mucous membranes. 5) Mucinex (guaifenesin) helps thin mucus and may make it easier to clear it from head, throat and lungs. 6) Increase your fluid consumption, especially water. Eat a well-balanced diet and avoid dairy and sugar as these can increase or thicken congestion. 7) Warm salt water gargle can help alleviate sore throat (1 teaspoon in 8 oz warm water)    8) Honey is a natural cough suppressor - can take a teaspoon of honey for cough or mix with hot tea. Local honey can help inoculate against local pollen that causes allergic reactions. (It has to be local honey from our area. You can usually find local honey at farmers' markets.)     9) Humidify air, especially in bedroom at night, as it may help with nasal and throat dryness. Breathing in steam from a shower may help loosen mucus and soothe an irritated throat. 10) Get plenty of rest!    11) A warm soak in a bath can help ease muscle and body aches.  Add 2 cup of epsom salt to water (Dr. Giselle Angel is a good one- at [employer] for around $6). Or you can put 1 cup of epsom salt in quart of warm water, soak a wash cloth in solution and apply to sore muscles. 12) Emergen C or [place] - vitamin supplements containing high doses of vitamin C, Vitamin B12 and B6 that can is marketed to help prevent or stop colds (although this has not been confirmed by scientific research)     If symptoms persist for more than 10 days or if you have a fever above 102, please call the office. Complications of URI include an infection of the skin around the eye and other infections. Watch for signs of fever, chills, body aches or any areas of red, warm, swollen and tender skin. Call immediately if you notice these signs. Well Visit, Women 48 to 72: Care Instructions  Your Care Instructions    Physical exams can help you stay healthy. Your doctor has checked your overall health and may have suggested ways to take good care of yourself. He or she also may have recommended tests. At home, you can help prevent illness with healthy eating, regular exercise, and other steps. Follow-up care is a key part of your treatment and safety. Be sure to make and go to all appointments, and call your doctor if you are having problems. It's also a good idea to know your test results and keep a list of the medicines you take. How can you care for yourself at home? · Reach and stay at a healthy weight. This will lower your risk for many problems, such as obesity, diabetes, heart disease, and high blood pressure. · Get at least 30 minutes of exercise on most days of the week. Walking is a good choice. You also may want to do other activities, such as running, swimming, cycling, or playing tennis or team sports. · Do not smoke. Smoking can make health problems worse. If you need help quitting, talk to your doctor about stop-smoking programs and medicines. These can increase your chances of quitting for good. · Protect your skin from too much sun.  When you're outdoors from 10 a.m. to 4 p.m., stay in the shade or cover up with clothing and a hat with a wide brim. Wear sunglasses that block UV rays. Even when it's cloudy, put broad-spectrum sunscreen (SPF 30 or higher) on any exposed skin. · See a dentist one or two times a year for checkups and to have your teeth cleaned. · Wear a seat belt in the car. · Limit alcohol to 1 drink a day. Too much alcohol can cause health problems. Follow your doctor's advice about when to have certain tests. These tests can spot problems early. · Cholesterol. Your doctor will tell you how often to have this done based on your age, family history, or other things that can increase your risk for heart attack and stroke. · Blood pressure. Have your blood pressure checked during a routine doctor visit. Your doctor will tell you how often to check your blood pressure based on your age, your blood pressure results, and other factors. · Mammogram. Ask your doctor how often you should have a mammogram, which is an X-ray of your breasts. A mammogram can spot breast cancer before it can be felt and when it is easiest to treat. · Pap test and pelvic exam. Ask your doctor how often you should have a Pap test. You may not need to have a Pap test as often as you used to. · Vision. Have your eyes checked every year or two or as often as your doctor suggests. Some experts recommend that you have yearly exams for glaucoma and other age-related eye problems starting at age 48. · Hearing. Tell your doctor if you notice any change in your hearing. You can have tests to find out how well you hear. · Diabetes. Ask your doctor whether you should have tests for diabetes. · Colon cancer. You should begin tests for colon cancer at age 48. You may have one of several tests. Your doctor will tell you how often to have tests based on your age and risk.  Risks include whether you already had a precancerous polyp removed from your colon or whether your parents, sisters and brothers, or children have had colon cancer. · Thyroid disease. Talk to your doctor about whether to have your thyroid checked as part of a regular physical exam. Women have an increased chance of a thyroid problem. · Osteoporosis. You should begin tests for bone density at age 72. If you are younger than 72, ask your doctor whether you have factors that may increase your risk for this disease. You may want to have this test before age 72. · Heart attack and stroke risk. At least every 4 to 6 years, you should have your risk for heart attack and stroke assessed. Your doctor uses factors such as your age, blood pressure, cholesterol, and whether you smoke or have diabetes to show what your risk for a heart attack or stroke is over the next 10 years. When should you call for help? Watch closely for changes in your health, and be sure to contact your doctor if you have any problems or symptoms that concern you. Where can you learn more? Go to http://leonie-deborah.info/. Enter R674 in the search box to learn more about \"Well Visit, Women 50 to 72: Care Instructions. \"  Current as of: March 29, 2018  Content Version: 11.8  © 7104-9921 [employer]. Care instructions adapted under license by [employer] (which disclaims liability or warranty for this information). If you have questions about a medical condition or this instruction, always ask your healthcare professional. [address] any warranty or liability for your use of this information.

## 2019-01-15 NOTE — PROGRESS NOTES
S: Fred Bender is a 46 y.o. BLACK OR  female who presents for     Assessment/Plan:  1. Well woman exam (no gynecological exam)  -discussed healthy diet and increasing daily exercise  -labs (ordered 11/2018): CBC, CMP, urinalysis, A1c, lipid, TSH  -HM: mammo ordered, Shingrix rx given;     2.  Essential hypertension  -current therapy: lisinopril 20mg daily   -BP today = 139/93    3. Tobacco abuse  -<1ppd, no intention of quitting  -discussed screening with low dose CT scan when she turns 53 yr old     4. URI, cough  -rx: benzonatate (TESSALON) 200 mg capsule;   -rx: amoxicillin 500mg bid x10 days  -supportive instructions given    RTC - pt tp schedule mammo, labs and PAP only appt same day      HPI:  Has a cold   +productive   +sore throat   3 days     Went to [facility] [employer] [facility]  4/5 brothers with cancer  Smoking <1ppd     Has gained 7lbs since Nov 2018    Social history:   Nutrition:  Ok overall   Breakfast: eggs, castaneda, grits, cereal  Lunch: pizza, hot dogs,   Dinner: 2-3 o'clock, fried cooking - typically Southern   Snacks: \"night muncher\"   Drinks: not a lot of water, cranberry juice, red wine   Social: lives with female (27yo) roommate nad her boyfriend, pt's dog; and pt's brother just moved in - he has cancer  Occupation: disabled not working    Social History     Tobacco Use   Smoking Status Current Every Day Smoker    Packs/day: 1.00    Types: Cigarettes    Start date: 1978   Smokeless Tobacco Never Used   Tobacco Comment    1 ppd  started age 11. Working on using E-cigs to quit     Social History     Substance and Sexual Activity   Alcohol Use Yes    Comment: socially, usually 2 at a time.  max 3 at a time      Social History     Substance and Sexual Activity   Drug Use No    Comment: remote h/o drug use in early 2000s - cocaine, MJ     Social History     Substance and Sexual Activity   Sexual Activity Not Currently    Partners: Male Patient's last menstrual period was 03/01/2015. Review of Systems:  - Constitutional Symptoms: no fevers/chills  - Cardiovascular: no chest pain or palpitations  - Respiratory: no cough or shortness of breath  - Gastrointestinal: no dysphagia or abdominal pain  - Musculoskeletal: no joint pains or weakness  - Integumentary: no rashes  - Neurological: no numbness, tingling, or headaches  - Endocrine:  no heat or cold intolerance, no polyuria or polydipsia  ROS is negative otherwise. PHQ over the last two weeks 1/15/2019   Little interest or pleasure in doing things Not at all   Feeling down, depressed, irritable, or hopeless Not at all   Total Score PHQ 2 0   Trouble falling or staying asleep, or sleeping too much -   Feeling tired or having little energy -   Poor appetite, weight loss, or overeating -   Feeling bad about yourself - or that you are a failure or have let yourself or your family down -   Trouble concentrating on things such as school, work, reading, or watching TV -   Moving or speaking so slowly that other people could have noticed; or the opposite being so fidgety that others notice -   Thoughts of being better off dead, or hurting yourself in some way -   PHQ 9 Score -       I reviewed the following:  Past Medical History:   Diagnosis Date    Anxiety     Arthritis     Bipolar disorder ([address]     bi polar Dr. 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)     Drug overdose, intentional ([address]     Drug-induced delirium ([address] 11/11/2016    Fibromyalgia 6/2007    Rheum Levern Beat p [phone], f [phone]    Fracture of lumbar spine ([address]     Found on MRI    Hyperlipidemia 9/29/2011    Hypertension     Migraines     Multiple personality disorder ([address]     Neuropathy     Radicular neuropathy     Dr. Misha Chapa at [facility]. Neck and back.      Suicidal ideations     Suicidal overdose ([address] 11/6/2016    Tobacco abuse     Vitamin D deficiency        Current Outpatient Medications   Medication Sig Dispense Refill    cholecalciferol (VITAMIN D3) 1,000 unit tablet Take 2 Tabs by mouth daily. 180 Tab 1    QUEtiapine (SEROQUEL) 25 mg tablet Take  by mouth.  DULoxetine (CYMBALTA) 20 mg capsule Take 20 mg by mouth daily.  divalproex DR (DEPAKOTE) 125 mg tablet Take 125 mg by mouth three (3) times daily.  loratadine-pseudoephedrine (LORATADINE-D) 5-120 mg per tablet TAKE 1 TABLET EVERY 12 HOURS AS NEEDED (CONGESTION/ALLERGY) 30 Tab 2    fluticasone (FLONASE) 50 mcg/actuation nasal spray USE 2 SPRAYS IN EACH NOSTRIL EVERY DAY 16 g 5    albuterol (VENTOLIN HFA) 90 mcg/actuation inhaler 2 PUFFS EVERY 4 HOURS AS NEEDED FOR WHEEZING OR SHORTNESS OF BREATH 1 Inhaler 2    Nebulizer & Compressor machine 1 Each by Does Not Apply route as needed. 1 Each 0    albuterol-ipratropium (DUO-NEB) 2.5 mg-0.5 mg/3 ml nebu 3 mL by Nebulization route every six (6) hours as needed. 30 Nebule 1    famotidine (PEPCID AC) 20 mg tablet Take 1 Tab by mouth nightly. 30 Tab 2    polyethylene glycol (MIRALAX) 17 gram/dose powder MIX 17 GM (FILL CAP FULL WITH POWDER) WITH 8OZ OF WATER, AND DRINK, ONCE A DAY, FOR CONSTIPATION 527 g 0    lisinopril (PRINIVIL, ZESTRIL) 20 mg tablet TAKE ONE TABLET BY MOUTH EVERY DAY 90 Tab 1    pravastatin (PRAVACHOL) 40 mg tablet TAKE ONE TABLET BY MOUTH EVERY DAY 90 Tab 0    guaiFENesin ER (MUCINEX) 600 mg ER tablet Take 1 Tab by mouth two (2) times a day. 60 Tab 0    promethazine (PHENERGAN) 25 mg tablet TAKE ONE TABLET BY MOUTH EVERY 6 HOURS AS NEEDED FOR NAUSEA 30 Tab 0    hydrOXYzine HCl (ATARAX) 25 mg tablet Take 1 Tab by mouth every six (6) hours as needed for Itching.  Indications: anxiety 60 Tab 3    tiotropium (SPIRIVA WITH HANDIHALER) 18 mcg inhalation capsule USE 1 CAPSULE DAILY, VIA INHALER DEVICE 30 Cap 2    methylPREDNISolone (MEDROL, WILNER,) 4 mg tablet Take as directed on dosage pack 21 Tab 0    cyclobenzaprine (FLEXERIL) 10 mg tablet Take 1 Tab by mouth three (3) times daily (with meals). 20 Tab 0    diclofenac EC (VOLTAREN) 75 mg EC tablet Take 1 Tab by mouth two (2) times a day. With food. 20 Tab 0       Allergies   Allergen Reactions    Vicodin [Hydrocodone-Acetaminophen] Itching        Health Maintenance:  PAP: schedule same day   Mammogram: needs order   DEXA: will do this next year - was still bleeding 1 1/2 yrs ago  Colonoscopy: 2011 q10y  Hep c; 2016  Flu: 12/2018  Shingles: discussed  Pneumo 23: 2009    O: VS:   Visit Vitals  BP (!) 139/93 (BP 1 Location: Left leg, BP Patient Position: Sitting)   Pulse 97   Temp 97.7 °F (36.5 °C) (Oral)   Resp 18   Ht 5' 3\" (1.6 m)   Wt 159 lb 11.2 oz (72.4 kg)   LMP 03/01/2015   SpO2 99%   BMI 28.29 kg/m²       Data Reviewed:  Bernardo Lux has gained 7lbs since last visit 11/2018  Labs:   · Hepatitis C (pt born [birth year]): Lab Results   Component Value Date/Time    Hep C Virus Ab <0.1 02/24/2016 03:23 PM     · A1C:      Lab Results   Component Value Date/Time    Hemoglobin A1c 5.6 11/06/2016 07:04 PM     · Lipids:  Lab Results   Component Value Date/Time    Cholesterol, total 153 11/06/2016 06:55 PM    HDL Cholesterol 43 11/06/2016 06:55 PM    LDL, calculated 93.6 11/06/2016 06:55 PM    VLDL, calculated 16.4 11/06/2016 06:55 PM    Triglyceride 82 11/06/2016 06:55 PM    CHOL/HDL Ratio 3.6 11/06/2016 06:55 PM       GENERAL: Bernardo Lux is in no acute distress. Non-toxic. Well nourished. Well developed. Appropriately groomed. HEAD:  Normocephalic. Atraumatic. Non tender sinuses x 4. EYE: PERRLA. EOMs intact. Sclera anicteric without injection. No drainage or discharge. EARS: Hearing intact bilaterally. External ear canals normal without evidence of blood or swelling. Bilateral TM's intact, pearly grey with landmarks visible. No erythema or effusion. NOSE: Patent. Nasal turbinates pink. No erythema.  No discharge. MOUTH: mucous membranes pink and moist. Posterior pharynx normal with cobblestone appearance. Erythema, no white exudate or obstruction. NECK: supple. Midline trachea. Cervical adenopathy noted. RESP: Breath sounds are symmetrical bilaterally. Unlabored without SOB. Speaking in full sentences. Clear to auscultation bilaterally anteriorly and posteriorly. No wheezes. No rales or rhonchi. CV: normal rate. Regular rhythm. S1, S2 audible. No murmur noted. No rubs, clicks or gallops noted. ABDOMEN: Flat without bulges or pulsations. Soft and nondistended. No masses or organomegaly. No rebound, rigidity or guarding. Bowel sounds normal x 4 quadrants. No tenderness on palpation  BACK: No visible deformities or curvature. Full ROM. No pain on palpation of the spinous processes in the cervical, thoracic, lumbar, sacral regions. No CVA tenderness. : deferred  NEURO:  awake, alert and oriented to person, place, and time and event. Clear speech. Muscle strength is +5/5 x 4 extremities. Sensation is intact to light touch bilaterally. Steady gait. MUSC:  Intact x 4 extremities. Full ROM x 4 extremities. No pain with movement. Patellar reflexes 2+  HEME/LYMPH: peripheral pulses palpable 2+ x 4 extremities. No peripheral edema is noted. SKIN: Skin is warm and dry. Turgor is normal. No petechiae, no purpura, no rash. PSYCH: appropriate behavior, dress. Good eye contact.     ______________________________________________________________________  I spent >25 minutes face to face with patient with >50% of time spent in counseling and coordinating care. Patient education was done. Advised on nutrition, physical activity, weight management, tobacco, alcohol and safety. Counseling included discussion of diagnosis, differentials, treatment options, prescribed treatment, warning signs and follow up. Medication risks/benefits,interactions and alternatives discussed with patient.    Patient verbalized understanding and agreed to plan of care. Patient was given an after visit summary which included current diagnoses, medications and vital signs. Follow up as directed.

## 2019-07-17 DIAGNOSIS — K21.00 GASTROESOPHAGEAL REFLUX DISEASE WITH ESOPHAGITIS: ICD-10-CM

## 2019-07-17 DIAGNOSIS — F41.9 ANXIETY: ICD-10-CM

## 2019-07-17 DIAGNOSIS — J44.1 CHRONIC OBSTRUCTIVE PULMONARY DISEASE WITH ACUTE EXACERBATION (HCC): ICD-10-CM

## 2019-07-17 DIAGNOSIS — I10 ESSENTIAL HYPERTENSION: ICD-10-CM

## 2019-07-17 DIAGNOSIS — E78.5 HYPERLIPIDEMIA, UNSPECIFIED HYPERLIPIDEMIA TYPE: ICD-10-CM

## 2019-07-17 RX ORDER — POLYETHYLENE GLYCOL 3350 17 G/17G
POWDER, FOR SOLUTION ORAL
Qty: 527 G | Refills: 0 | Status: SHIPPED | OUTPATIENT
Start: 2019-07-17 | End: 2020-05-22 | Stop reason: SDUPTHER

## 2019-07-17 RX ORDER — ALBUTEROL SULFATE 90 UG/1
AEROSOL, METERED RESPIRATORY (INHALATION)
Qty: 1 INHALER | Refills: 3 | Status: SHIPPED | OUTPATIENT
Start: 2019-07-17 | End: 2019-11-29 | Stop reason: SDUPTHER

## 2019-07-17 RX ORDER — HYDROXYZINE 25 MG/1
25 TABLET, FILM COATED ORAL
Qty: 60 TAB | Refills: 3 | Status: SHIPPED | OUTPATIENT
Start: 2019-07-17 | End: 2019-11-29 | Stop reason: SDUPTHER

## 2019-07-17 RX ORDER — FAMOTIDINE 20 MG/1
20 TABLET, FILM COATED ORAL
Qty: 30 TAB | Refills: 2 | Status: SHIPPED | OUTPATIENT
Start: 2019-07-17 | End: 2019-11-01 | Stop reason: SDUPTHER

## 2019-07-17 RX ORDER — PRAVASTATIN SODIUM 40 MG/1
TABLET ORAL
Qty: 90 TAB | Refills: 0 | Status: SHIPPED | OUTPATIENT
Start: 2019-07-17 | End: 2020-04-02

## 2019-07-17 RX ORDER — FLUTICASONE PROPIONATE 50 MCG
SPRAY, SUSPENSION (ML) NASAL
Qty: 16 G | Refills: 5 | Status: SHIPPED | OUTPATIENT
Start: 2019-07-17 | End: 2020-04-09

## 2019-07-17 RX ORDER — LISINOPRIL 20 MG/1
20 TABLET ORAL DAILY
Qty: 90 TAB | Refills: 0 | Status: SHIPPED | OUTPATIENT
Start: 2019-07-17 | End: 2020-04-02

## 2019-07-17 RX ORDER — MELATONIN
2000 DAILY
Qty: 180 TAB | Refills: 1 | Status: SHIPPED | OUTPATIENT
Start: 2019-07-17 | End: 2020-05-22 | Stop reason: SDUPTHER

## 2019-11-19 ENCOUNTER — TELEPHONE (OUTPATIENT)
Dept: FAMILY MEDICINE CLINIC | Age: 54
End: 2019-11-19

## 2019-11-19 NOTE — TELEPHONE ENCOUNTER
----- Message from Xiomara Arce sent at 11/19/2019 10:59 AM EST -----  Regarding: MANSOOR Rosado/telephone  Reason for call: Medication List  Callback required yes/no and why: Yes, pt needs list of her medications faxed over to [facility] [facility] contact number(s): [phone]  Fax number: [phone]  Details to clarify the request:

## 2019-11-29 DIAGNOSIS — F41.9 ANXIETY: ICD-10-CM

## 2019-11-29 DIAGNOSIS — J44.1 CHRONIC OBSTRUCTIVE PULMONARY DISEASE WITH ACUTE EXACERBATION (HCC): ICD-10-CM

## 2019-11-29 RX ORDER — ALBUTEROL SULFATE 90 UG/1
AEROSOL, METERED RESPIRATORY (INHALATION)
Qty: 18 G | Refills: 0 | Status: SHIPPED | OUTPATIENT
Start: 2019-11-29 | End: 2020-04-02

## 2019-11-29 RX ORDER — HYDROXYZINE 25 MG/1
25 TABLET, FILM COATED ORAL
Qty: 60 TAB | Refills: 0 | Status: SHIPPED | OUTPATIENT
Start: 2019-11-29 | End: 2019-12-26

## 2020-04-09 DIAGNOSIS — J44.1 CHRONIC OBSTRUCTIVE PULMONARY DISEASE WITH ACUTE EXACERBATION (HCC): ICD-10-CM

## 2020-04-09 RX ORDER — FLUTICASONE PROPIONATE 50 MCG
SPRAY, SUSPENSION (ML) NASAL
Qty: 16 G | Refills: 0 | Status: SHIPPED | OUTPATIENT
Start: 2020-04-09 | End: 2020-05-14

## 2020-05-08 ENCOUNTER — TELEPHONE (OUTPATIENT)
Dept: FAMILY MEDICINE CLINIC | Age: 55
End: 2020-05-08

## 2020-05-08 NOTE — TELEPHONE ENCOUNTER
----- Message from Deloris Mohan sent at 5/8/2020  1:01 PM EDT -----  Regarding: NP, Alonzo/Telephone  Env General Message/Vendor Calls    Caller's first and last name:      Reason for call:  Regarding she hasn't received all her Rx's       Call back required yes/no and why: Yes       Best contact number(s): [phone]      Details to clarify the request:      Deloris Mohan

## 2020-05-08 NOTE — TELEPHONE ENCOUNTER
Returned call to patient. Unable to reach patient at this time. No  set up to leave message to return call.

## 2020-05-22 ENCOUNTER — VIRTUAL VISIT (OUTPATIENT)
Dept: FAMILY MEDICINE CLINIC | Age: 55
End: 2020-05-22

## 2020-05-22 DIAGNOSIS — E78.5 HYPERLIPIDEMIA, UNSPECIFIED HYPERLIPIDEMIA TYPE: ICD-10-CM

## 2020-05-22 DIAGNOSIS — F41.9 ANXIETY: ICD-10-CM

## 2020-05-22 DIAGNOSIS — K59.03 DRUG-INDUCED CONSTIPATION: ICD-10-CM

## 2020-05-22 DIAGNOSIS — J44.1 CHRONIC OBSTRUCTIVE PULMONARY DISEASE WITH ACUTE EXACERBATION (HCC): Primary | ICD-10-CM

## 2020-05-22 DIAGNOSIS — K21.00 GASTROESOPHAGEAL REFLUX DISEASE WITH ESOPHAGITIS: ICD-10-CM

## 2020-05-22 DIAGNOSIS — I10 ESSENTIAL HYPERTENSION: ICD-10-CM

## 2020-05-22 RX ORDER — ALBUTEROL SULFATE 90 UG/1
AEROSOL, METERED RESPIRATORY (INHALATION)
Qty: 18 G | Refills: 1 | Status: SHIPPED | OUTPATIENT
Start: 2020-05-22 | End: 2020-09-15 | Stop reason: SDUPTHER

## 2020-05-22 RX ORDER — FLUTICASONE PROPIONATE 50 MCG
2 SPRAY, SUSPENSION (ML) NASAL DAILY
Qty: 16 G | Refills: 0 | Status: SHIPPED | OUTPATIENT
Start: 2020-05-22 | End: 2020-07-10

## 2020-05-22 RX ORDER — FAMOTIDINE 20 MG/1
TABLET, FILM COATED ORAL
Qty: 90 TAB | Refills: 0 | Status: SHIPPED | OUTPATIENT
Start: 2020-05-22 | End: 2020-07-10

## 2020-05-22 RX ORDER — PSEUDOEPHEDRINE HCL 30 MG
TABLET ORAL
Qty: 90 TAB | Refills: 1 | Status: SHIPPED | OUTPATIENT
Start: 2020-05-22 | End: 2020-06-11 | Stop reason: ALTCHOICE

## 2020-05-22 RX ORDER — PRAVASTATIN SODIUM 40 MG/1
TABLET ORAL
Qty: 90 TAB | Refills: 0 | Status: SHIPPED | OUTPATIENT
Start: 2020-05-22 | End: 2020-09-15 | Stop reason: SDUPTHER

## 2020-05-22 RX ORDER — LISINOPRIL 20 MG/1
TABLET ORAL
Qty: 90 TAB | Refills: 1 | Status: SHIPPED | OUTPATIENT
Start: 2020-05-22 | End: 2020-09-15

## 2020-05-22 RX ORDER — MELATONIN
2000 DAILY
Qty: 180 TAB | Refills: 1 | Status: SHIPPED | OUTPATIENT
Start: 2020-05-22

## 2020-05-22 RX ORDER — POLYETHYLENE GLYCOL 3350 17 G/17G
POWDER, FOR SOLUTION ORAL
Qty: 527 G | Refills: 0 | Status: SHIPPED | OUTPATIENT
Start: 2020-05-22

## 2020-05-22 RX ORDER — IPRATROPIUM BROMIDE AND ALBUTEROL SULFATE 2.5; .5 MG/3ML; MG/3ML
3 SOLUTION RESPIRATORY (INHALATION)
Qty: 30 NEBULE | Refills: 1 | Status: SHIPPED | OUTPATIENT
Start: 2020-05-22 | End: 2020-09-15 | Stop reason: SDUPTHER

## 2020-05-22 RX ORDER — HYDROXYZINE 25 MG/1
TABLET, FILM COATED ORAL
Qty: 180 TAB | Refills: 0 | Status: SHIPPED | OUTPATIENT
Start: 2020-05-22 | End: 2020-07-10

## 2020-05-22 RX ORDER — NEBULIZER AND COMPRESSOR
1 EACH MISCELLANEOUS AS NEEDED
Qty: 1 EACH | Refills: 0 | Status: SHIPPED | OUTPATIENT
Start: 2020-05-22 | End: 2020-09-15 | Stop reason: SDUPTHER

## 2020-05-22 NOTE — PROGRESS NOTES
S: Jian Hodge is a 53 y.o. female who presents for medication check and refill. Assessment/Plan: Virtual Visit     1. Chronic obstructive pulmonary disease with acute exacerbation (HCC)  - current therapy: Spiriva + Albuterol + duo neb (never got nebulizer, advised will send in another rx and to speak to pharmacy about insurance coverage)    2. Hyperlipidemia, unspecified hyperlipidemia type  -current therapy: pravastatin 40mg  -advised need labs before another refill is given    3. Allergies  -current therapy: flonase + claritin + hydrOXYzine HCL 25mg prn for itching    4. Drug-induced constipation  -miralax 17g prn     5. Gastroesophageal reflux disease with esophagitis  -current therapy: famotidine 20mg prn    6. Essential Hypertension  -current therapy: lisinopril 20mg  -BP at home <140/90  -advised to monitor BP at home and keep log; goal<140/90 make OV if consistently above goal or <110/60    7. Vit D Deficiency  -current therapy: D3 OTC 2k units     21 or more minutes were spent on the digital evaluation and management of this patient.    RTC 1-2 months for CPE/fasting labs       Requesting refills for:     COPD   current therapy: Spiriva + Albuterol + duo neb    Vitamin D deficiency  Current therapy: Vit d3 OTC - 2,000 units ( 2 tabs 1,000units)    Acid Reflux  current therapy: pepcid     Allergy: flonase + claritin   Atarax - used for itching, skin gets rash all year but mostly during allergy season    HTN  Current therapy: lisinopril 20mg  BP at home <140/90 - pt has home machine, but currently in car and doesn't have access to records      Constipation  miralax - needs bc takes pain meds  Doesn't take daily  BM - normal, no issues     XOL   Current therapy: pravastatin 40mg  Advised need labs before another refill is given    Social history:   Social: driving in car with daughter during VV    Social History     Tobacco Use   Smoking Status Current Every Day Smoker    Packs/day: 1.00    Types: Cigarettes    Start date: 1978   Smokeless Tobacco Never Used   Tobacco Comment    1 ppd  started age 11. Working on using E-cigs to quit     Social History     Substance and Sexual Activity   Alcohol Use Yes    Comment: socially, usually 2 at a time. max 3 at a time      Social History     Substance and Sexual Activity   Drug Use No    Comment: remote h/o drug use in early 2000s - cocaine, MJ       Review of Systems:  - Constitutional Symptoms: no fevers, chills,   - Cardiovascular: no chest pain or palpitations      I reviewed the following:  Current Outpatient Medications   Medication Sig Dispense Refill    fluticasone propionate (FLONASE) 50 mcg/actuation nasal spray USE 2 SPRAYS IN EACH NOSTRIL EVERY DAY 16 g 0    lisinopriL (PRINIVIL, ZESTRIL) 20 mg tablet Take 1 Tab by mouth daily. - blood pressure 30 Tab 0    pravastatin (PRAVACHOL) 40 mg tablet TAKE ONE TABLET BY MOUTH EVERY DAY -LOWER CHOLESTEROL 30 Tab 0    hydrOXYzine HCL (ATARAX) 25 mg tablet TAKE ONE TABLET BY MOUTH EVERY 6 HOURS AS NEEDED FOR ITCHING. 30 Tab 0    albuterol (Ventolin HFA) 90 mcg/actuation inhaler 2 PUFFS EVERY 4 HOURS AS NEEDED FOR WHEEZING OR SHORTNESS OF BREATH 18 g 1    tiotropium (Spiriva with HandiHaler) 18 mcg inhalation capsule USE 1 CAPSULE DAILY, VIA INHALER DEVICE 30 Cap 2    famotidine (PEPCID) 20 mg tablet TAKE ONE TABLET BY MOUTH EVERY NIGHT. APPT DUE 30 Tab 0    cholecalciferol (VITAMIN D3) 1,000 unit tablet Take 2 Tabs by mouth daily. 180 Tab 1    polyethylene glycol (MIRALAX) 17 gram/dose powder MIX 17 GM (FILL CAP FULL WITH POWDER) WITH 8OZ OF WATER, AND DRINK, ONCE A DAY, FOR CONSTIPATION 527 g 0    loratadine-pseudoephedrine (LORATADINE-D) 5-120 mg per tablet TAKE 1 TABLET EVERY 12 HOURS AS NEEDED (CONGESTION/ALLERGY) 90 Tab 2    QUEtiapine (SEROQUEL) 25 mg tablet Take  by mouth.  DULoxetine (CYMBALTA) 20 mg capsule Take 20 mg by mouth daily.       divalproex DR (DEPAKOTE) 125 mg tablet Take 125 mg by mouth three (3) times daily.  Nebulizer & Compressor machine 1 Each by Does Not Apply route as needed. 1 Each 0    promethazine (PHENERGAN) 25 mg tablet TAKE ONE TABLET BY MOUTH EVERY 6 HOURS AS NEEDED FOR NAUSEA 30 Tab 0    cyclobenzaprine (FLEXERIL) 10 mg tablet Take 1 Tab by mouth three (3) times daily (with meals). 20 Tab 0    diclofenac EC (VOLTAREN) 75 mg EC tablet Take 1 Tab by mouth two (2) times a day. With food. 20 Tab 0    albuterol-ipratropium (DUO-NEB) 2.5 mg-0.5 mg/3 ml nebu 3 mL by Nebulization route every six (6) hours as needed (as needed). 30 Nebule 1    guaiFENesin ER (MUCINEX) 600 mg ER tablet Take 1 Tab by mouth two (2) times a day. 60 Tab 0    methylPREDNISolone (MEDROL, WILNER,) 4 mg tablet Take as directed on dosage pack 21 Tab 0       Past Medical History:   Diagnosis Date    Anxiety     Arthritis     Bipolar disorder (HCC)     bi polar Dr. Pacheco Iqbal has dismissed her as a pt    Cocaine abuse ([facility] 10/12    Based on ER urine tox screen    COPD     Dissociative identity disorder [facility])     Drug overdose, intentional ([facility]     Drug-induced delirium 11/11/2016    Fibromyalgia 6/2007    Rheum Stacie Rosa p [phone], f [phone]    Fracture of lumbar spine ([facility]     Found on MRI    Hyperlipidemia 9/29/2011    Hypertension     Migraines     Multiple personality disorder ([facility]     Neuropathy     Radicular neuropathy     Dr. Estephania Corrales at [address] [place] [address]. Neck and back.  Suicidal ideations     Suicidal overdose ([facility] 11/6/2016    Tobacco abuse     Vitamin D deficiency        Allergies   Allergen Reactions    Vicodin [Hydrocodone-Acetaminophen] Itching       O: VS: VV no VS taken    GENERAL: [place] is in no acute distress. Non-toxic. Well nourished. Well developed. Appropriately groomed. Clear and coherent speech  _______________________________________________________________  Patient education was done. Advised on bowel habits, allergy tx, and safety.   Counseling included discussion of diagnosis, differentials, treatment options, prescribed treatment, warning signs and follow up. Medication risks/benefits,interactions and alternatives discussed with patient.      Patient verbalized understanding and agreed to plan of care. Patient was given an after visit summary which included current diagnoses, medications and vital signs. Follow up as directed. Jan Avila is a 53 y.o. female who was seen by synchronous (real-time) audio-video technology on 5/22/2020. Consent: Jan Avila, who was seen by synchronous (real-time) audio-video technology, and/or her healthcare decision maker, is aware that this patient-initiated, Telehealth encounter on 5/22/2020 is a billable service, with coverage as determined by her insurance carrier. She is aware that she may receive a bill and has provided verbal consent to proceed: Yes. Jan Avila is a 53 y.o. female who was evaluated by a video visit encounter for concerns as above. Patient identification was verified prior to start of the visit. A caregiver was present when appropriate. Due to this being a TeleHealth encounter (During MWIKI-16 public health emergency), evaluation of the following organ systems was limited: Vitals/Constitutional/EENT/Resp/CV/GI//MS/Neuro/Skin/Heme-Lymph-Imm. Pursuant to the emergency declaration under the [facility] waiver authority and the [employer] Act, this Virtual  Visit was conducted, with patient's (and/or legal guardian's) consent, to reduce the patient's risk of exposure to COVID-19 and provide necessary medical care. Services were provided through a video synchronous discussion virtually to substitute for in-person clinic visit. Patient and provider were located at their individual homes.       Maribel Townsend NP

## 2020-05-22 NOTE — PATIENT INSTRUCTIONS
1) Refill on medications 2) Please check your blood pressure through the week at staggered times in the day. (If you check in the morning, it should be at least one hour after your morning blood pressure medications.) Arm monitors are most accurate. If you use a wrist monitor, make sure your wrist is at heart level. You can bring your home monitor to your next visit and have it calibrated with the machine in the office to gauge your readings. Sit  with your feet uncrossed and relax for 5 minutes before taking your BP. Keep a written record of your blood pressure readings and bring it to each appointment. If your systolic (top) blood pressure is consistently greater than 140mmHg or less than 228NTON, OR the diastolic (bottom) number is consistently greater than 90mmHg or less than 60mmHg, then please schedule an office appointment. Work on healthy eating - no salt diet, more potassium (helps flush out sodium,making healthier heart and arteries) - and incorporating daily exercise into your routine. Cardiac symptoms that would need immediate attention include: uncomfortable pressure, squeezing, fullness or pain in the center of your chest. Pain or discomfort in one or both arms, the back, neck, jaw or stomach. Shortness of breath with or without chest discomfort, breaking out in a cold sweat, nausea or lightheadedness. 3) Cholesterol - pravastatin 40mg Need labs for another refill - haven't had labs in 2+ years!! 
 
Try these lifestyle strategies to lower your cholesterol: ? Decrease saturated fats in diet. Saturated fats are found in:  
o meats including fatty beef, pork, lamb, chicken or turkey with skin, and ground beef,  
o high fat cheese,  
o whole fat diary, milk, cream and ice cream, 
o butter 
o most saturated fats are found in animal products ? Eliminate Trans Fats from your diet.  Foods that contain TF include: 
o Fast foods: fried chicken, biscuits, fried fish for fried fish sandwichs, [place] fries 
o Donuts and muffins 
o Crackers and cookies 
o Cake, cake icing and pies 
o Canned biscuits or refrigerated dough 
o Microwave popcorn 
o Coffee creamer 
o Frozen pizza 
o Stick margarine or vegetable shortening ? Increase the amount of soluble fiber in your diet. Sources of soluble fiber include: 
o oats, peas, beans, apples, citrus fruits, carrots, barley and psyllium ? Include omega-3 fatty acids in your diet, these are found in:  
o FISH! Try and eat 2 servings of fish a week. Good examples include salmon, albacore tuna, halibut, trout and mackerel. 
o Take a fish oil supplement daily ? Look for foods enriched with plant-sterols. There are many products on the market which are fortified with this nutrient including buttery spreads and yogurts. Look for the Benechol and Promise brands. ? Red yeast rice - at least 1800 mg daily to help lower cholesterol. Take either 2 of the 900 or 1200 mg capsules daily or 3-4 of the 600 mg capsules all together or  throughout the day ? Increase your physical activity to at least 150 minutes a week. This is just 5 sessions of thirty minutes a week! ? Losing weight can significantly lower your cholesterol. ? If you are a smoker, QUIT SMOKING, this can significantly lower your cholesterol and overall cardiovascular risk. It also can help to decrease your risk for many other conditions. 4) Allergy management:  
 
1)  Take a daily antihistamine to reduce allergic symptoms such as sneezing, runny nose and itchy eyes. You can buy these over the counter (OTC = no prescription needed) such as Claritin, Allegra or Zyrtec. Take this daily as it takes 3-4 weeks for the full therapeutic effect to take place. Follow directions on package. If symptoms of sneezing, coughing and runny nose are severe, you can try taking Benadryl at night before bed.  However, Benadryl can cause drowsiness, so please use caution. Elderly people should not use Benadryl due to side effects and increase risk of falling. 2)  Use an OTC decongestant nasal spray such as Flonase, Nasonex, or Rhinocort. These contain a steroid and will help reduce congestion for 3-5 days. You can spray 2x in each nostril two times a day. Use the opposite hand of the nostril you are spraying and look down at your toes when you administer the nasal spray. This ensures the spray is applied to the nasal tissue properly. If you use Afrin for nasal congestion, DO NOT use for more than 5 days (due to rebound congestion) 3) You can also use a saline nasal spray 3-4 times a day or a jaime potty (never use tap water due to possible bacterial contamination) to help flush out the sinuses. This helps reduce the irritants that can increase allergic symptoms. 4)  OTC Robitussin or Delsym for cough relief. Follow directions on package. Do not exceed maximum dose. May cause drowsiness. If you have high blood pressure or kidney problems, avoid cough medicines that contain decongestants  such as pseudoephedrine, ephedrine, phenylephrine, naphazoline and oxymetazoline. 5) Warm salt water gargle can help alleviate sore throat 6) Try taking a teaspoon of local honey 2x day (but no more than 1 tablespoon a day) - to help strengthen your body's tolerance to allergens. It is important to buy local honey as the bees use plants in your area to produce their honey. Honey is also a natural cough suppressant. Eat a healthy diet, drink plenty of water and get 8 hours of sleep nightly. 5) COVID-19 is caused by a coronavirus called SARS-CoV-2. Coronaviruses are a large family of viruses that are common in people and may different species of animals, including camels, cattle, cats, and bats. Rarely, animal coronaviruses can infect people and then spread between people. This occurred with MERS-CoV and SARS-CoV, and now with the virus that causes COVID-19. The COVID-19 virus is thought to spread mainly from person to person, most commonly through respiratory droplets produced when an infected person coughs or sneezes. These droplets can land in the mouths or noses of people who are nearby or possibly be inhaled into the lungs. Spread is more likely when people are in close contact with one another (within about 6 feet). All the surfaces where these respiratory droplets land are infectious for about 3-5 days on average; therefore, everything that is associated with infected people will be contaminated and potentially infectious. Information from the ongoing COVID-19 pandemic suggest that this virus is spreading more efficiently than influenza, but not as efficiently as measles, which is highly contagious. Primary symptoms = fever, cough, and/or shortness of breath. Some people report fatigue, muscle aches, headache and loss of smell/taste Symptoms may appear 2-14 days after exposure. People are thought to be infectious 2 days before showing symptoms. If you think you have been exposed to COVID 19, have travelled to a high risk area or have symptoms, [place] [address] has a COVID 19 hotline to answer questions (ph: [phone]). [address] [place] [address]'s [facility]-13 hotline number is [phone]. Tips for preventing spread of infection: 
 
1) NO HANDSHAKING! Use ONLY your knuckle to touch light switches. elevator buttons, etc.. Lift the gasoline dispenser with a paper towel or use a disposable glove. Open doors with your closed fist or hip - do not grasp the handle with your hand, unless there is no other way to open the door. Especially important on bathroom and post office/commercial doors. 2) Social distancing - keep 6 feet away from others when out in public.   If someone is experiencing symptoms of COVID19, they need to self quarantine for 14 days to prevent spreading the infection to others. 3) Clean and disinfect frequently touched surfaces daily, such as tables, doorknobs, light switches, remotes, phones. 4) Do not share personal household items, such as dishes, drinking glasses, cups, utensils, towels or bedding with other people who show signs of infection. 5) Wash your hands with soap for 10-20 seconds and/or use a greater than 60% alcohol-based hand  whenever you return home from ANY activity that involves locations where other people have been. 6) Avoid touching your eyes, nose and mouth with unwashed hands. Even though only N95 masks actually stop the virus from passing through,  non-N95 masks are used to prevent you from touching your face and inoculating yourself. 7) If possible, cough or sneeze into a disposable tissue and discard. Use your elbow only if you have to. The clothing on your elbow will contain infectious virus that can be passed on. 8) Zinc lozenges have scientific evidence that show they are effective in blocking coronavirus (and most other viruses) from multiplying in your throat and nasopharynx. Use as directed several times each day when you begin to feel any \"cold-like\" symptoms beginning. It is best to lie down and let the lozenge dissolve in the back of your throat and nasopharynx. Cold-Eeze lozenges is one brand available, but there are other brands available. 9) CDC recommends wearing cloth face coverings in public settings where other social distancing measures are difficult to maintain (e.g., grocery stores and pharmacies) especially in areas of significant community-based transmission. CDC also advises the use of simple cloth face coverings to slow the spread of the virus and help people who may have the virus and do not know it from transmitting it to others.   Cloth face coverings fashioned from household items or made at home from common materials at low cost can be used as an additional, voluntary public health measure. Please note: The cloth face coverings recommended by the CDC are not surgical or N-95 masks. Those are critical supplies that must continue to be reserved for health care workers and other medical first responders, as recommended by current CDC guidance. KEEP IN MIND: THERE IS NO TREATMENT FOR THIS INFECTION. People who are mildly ill and think they may have the infection, should keep away from other people (self quarantine) for 14 days and care for themselves at home. (Rest, drink plenty of fluids, stay home.) If you are SICK,  then you should wear of face mask to help prevent the spread of the virus. As of 4/13/2020, people can get tested for COVID 19 at the [facility] flu clinics on a walk-in basis, depending on availability of testing kits/supplies and current guidelines. This policy is subject to change daily. If you have confirmed or suspected COVID 19 and have been directed to isolate at home, you can stop home isolation if: 
1) you have had no fever for at least 3 full days (72 hours) without the use of medications AND 2) respiratory symptoms have improved, including no shortness of breath, coughing reduced AND 3) at least 10 days have passed since your symptoms first appeared Updated: 5/15/20 Learning About the [address] Diet What is the Mediterranean diet? The Mediterranean diet is a style of eating rather than a diet plan. It features foods eaten in [place], [place], [place], and other countries along the [facility]. It emphasizes eating foods like fish, fruits, vegetables, beans, high-fiber breads and whole grains, nuts, and olive oil. This style of eating includes limited red meat, cheese, and sweets. Why choose the Mediterranean diet? A Mediterranean-style diet may improve heart health. It contains more fat than other heart-healthy diets. But the fats are mainly from nuts, unsaturated oils (such as fish oils and olive oil), and certain nut or seed oils (such as canola, soybean, or flaxseed oil). These fats may help protect the heart and blood vessels. How can you get started on the Mediterranean diet? Here are some things you can do to switch to a more Mediterranean way of eating. What to eat · Eat a variety of fruits and vegetables each day, such as grapes, blueberries, tomatoes, broccoli, peppers, figs, olives, spinach, eggplant, beans, lentils, and chickpeas. · Eat a variety of whole-grain foods each day, such as oats, brown rice, and whole wheat bread, pasta, and couscous. · Eat fish at least 2 times a week. Try tuna, salmon, mackerel, lake trout, herring, or sardines. · Eat moderate amounts of low-fat dairy products, such as milk, cheese, or yogurt. · Eat moderate amounts of poultry and eggs. · Choose healthy (unsaturated) fats, such as nuts, olive oil, and certain nut or seed oils like canola, soybean, and flaxseed. · Limit unhealthy (saturated) fats, such as butter, palm oil, and coconut oil. And limit fats found in animal products, such as meat and dairy products made with whole milk. Try to eat red meat only a few times a month in very small amounts. · Limit sweets and desserts to only a few times a week. This includes sugar-sweetened drinks like soda. The Mediterranean diet may also include red wine with your meal1 glass each day for women and up to 2 glasses a day for men. Tips for eating at home · Use herbs, spices, garlic, lemon zest, and citrus juice instead of salt to add flavor to foods. · Add avocado slices to your sandwich instead of castaneda. · Have fish for lunch or dinner instead of red meat. Brush the fish with olive oil, and broil or grill it. · Sprinkle your salad with seeds or nuts instead of cheese. · Cook with olive or canola oil instead of butter or oils that are high in saturated fat. · Switch from 2% milk or whole milk to 1% or fat-free milk. · Dip raw vegetables in a vinaigrette dressing or hummus instead of dips made from mayonnaise or sour cream. 
· Have a piece of fruit for dessert instead of a piece of cake. Try baked apples, or have some dried fruit. Tips for eating out · Try broiled, grilled, baked, or poached fish instead of having it fried or breaded. · Ask your  to have your meals prepared with olive oil instead of butter. · Order dishes made with marinara sauce or sauces made from olive oil. Avoid sauces made from cream or mayonnaise. · Choose whole-grain breads, whole wheat pasta and pizza crust, brown rice, beans, and lentils. · Cut back on butter or margarine on bread. Instead, you can dip your bread in a small amount of olive oil. · Ask for a side salad or grilled vegetables instead of french fries or chips. Where can you learn more? Go to http://leonie-deborah.info/ Enter 033-678-9672 in the search box to learn more about \"Learning About the Mediterranean Diet. \" Current as of: August 21, 2019Content Version: 12.4 © 0790-2446 [employer]. Care instructions adapted under license by [employer] (which disclaims liability or warranty for this information). If you have questions about a medical condition or this instruction, always ask your healthcare professional. [place] [address] any warranty or liability for your use of this information.

## 2020-05-22 NOTE — PROGRESS NOTES
Chief Complaint   Patient presents with    Medication Refill     Albuterol   Vit d3   pepcid   flonase   claritin   Atarax   Lisinopril  loratidine   miralax   pracastatin   Phenergan  cymbalata     1. Have you been to the ER, urgent care clinic since your last visit? Hospitalized since your last visit? No    2. Have you seen or consulted any other health care providers outside of the [address] [place] [address] since your last visit? Include any pap smears or colon screening.  No

## 2020-06-04 ENCOUNTER — TELEPHONE (OUTPATIENT)
Dept: FAMILY MEDICINE CLINIC | Age: 55
End: 2020-06-04

## 2020-06-04 NOTE — TELEPHONE ENCOUNTER
Called pt back, informed her that the Claritin (Loratadine) was already sent to the pharmacy. Pt states she will call pharmacy to see if they have it and will give us a call back if they do not. Verified understanding. Put in a new Rx for PCP, can disregard.

## 2020-06-11 RX ORDER — LORATADINE 10 MG/1
10 TABLET ORAL DAILY
Qty: 90 TAB | Refills: 1 | Status: SHIPPED | OUTPATIENT
Start: 2020-06-11 | End: 2020-09-15 | Stop reason: SDUPTHER

## 2020-07-28 ENCOUNTER — TELEPHONE (OUTPATIENT)
Dept: FAMILY MEDICINE CLINIC | Age: 55
End: 2020-07-28

## 2020-07-28 NOTE — TELEPHONE ENCOUNTER
Called pt to inform that her Spiriva is no longer covered by her insurance and that she will have to contact her pharmacy or insurance to see what may be covered. Pts mobile number is out of service and her home number has a voice mail that is not set up, unable to leave a message for a call back to the office to discuss.

## 2020-08-04 NOTE — TELEPHONE ENCOUNTER
Called patient back at home and mobile number. Both numbers are currently out of service. Unable to reach patient at this time.

## 2020-09-15 ENCOUNTER — TELEPHONE (OUTPATIENT)
Dept: FAMILY MEDICINE CLINIC | Age: 55
End: 2020-09-15

## 2020-09-15 ENCOUNTER — OFFICE VISIT (OUTPATIENT)
Dept: FAMILY MEDICINE CLINIC | Age: 55
End: 2020-09-15
Payer: MEDICARE

## 2020-09-15 VITALS
HEART RATE: 80 BPM | HEIGHT: 63 IN | BODY MASS INDEX: 29.96 KG/M2 | RESPIRATION RATE: 17 BRPM | DIASTOLIC BLOOD PRESSURE: 96 MMHG | OXYGEN SATURATION: 98 % | TEMPERATURE: 97.1 F | SYSTOLIC BLOOD PRESSURE: 153 MMHG | WEIGHT: 169.1 LBS

## 2020-09-15 DIAGNOSIS — I10 ESSENTIAL HYPERTENSION: Primary | ICD-10-CM

## 2020-09-15 DIAGNOSIS — E55.9 VITAMIN D DEFICIENCY: ICD-10-CM

## 2020-09-15 DIAGNOSIS — E78.5 HYPERLIPIDEMIA, UNSPECIFIED HYPERLIPIDEMIA TYPE: ICD-10-CM

## 2020-09-15 DIAGNOSIS — Z00.00 LABORATORY EXAM ORDERED AS PART OF ROUTINE GENERAL MEDICAL EXAMINATION: ICD-10-CM

## 2020-09-15 DIAGNOSIS — J44.1 CHRONIC OBSTRUCTIVE PULMONARY DISEASE WITH ACUTE EXACERBATION (HCC): ICD-10-CM

## 2020-09-15 DIAGNOSIS — Z11.59 ENCOUNTER FOR HEPATITIS C SCREENING TEST FOR LOW RISK PATIENT: ICD-10-CM

## 2020-09-15 DIAGNOSIS — Z23 ENCOUNTER FOR IMMUNIZATION: ICD-10-CM

## 2020-09-15 DIAGNOSIS — L02.01 FACIAL ABSCESS: ICD-10-CM

## 2020-09-15 DIAGNOSIS — Z00.00 WELL WOMAN EXAM (NO GYNECOLOGICAL EXAM): ICD-10-CM

## 2020-09-15 LAB
25(OH)D3 SERPL-MCNC: 23.8 NG/ML (ref 30–100)
ALBUMIN SERPL-MCNC: 3.9 G/DL (ref 3.5–5)
ALBUMIN/GLOB SERPL: 1 {RATIO} (ref 1.1–2.2)
ALP SERPL-CCNC: 81 U/L (ref 45–117)
ALT SERPL-CCNC: 47 U/L (ref 12–78)
ANION GAP SERPL CALC-SCNC: 10 MMOL/L (ref 5–15)
APPEARANCE UR: CLEAR
AST SERPL-CCNC: 21 U/L (ref 15–37)
BACTERIA URNS QL MICRO: ABNORMAL /HPF
BASOPHILS # BLD: 0 K/UL (ref 0–0.1)
BASOPHILS NFR BLD: 0 % (ref 0–1)
BILIRUB SERPL-MCNC: 0.6 MG/DL (ref 0.2–1)
BILIRUB UR QL: NEGATIVE
BUN SERPL-MCNC: 9 MG/DL (ref 6–20)
BUN/CREAT SERPL: 12 (ref 12–20)
CALCIUM SERPL-MCNC: 9.5 MG/DL (ref 8.5–10.1)
CHLORIDE SERPL-SCNC: 104 MMOL/L (ref 97–108)
CHOLEST SERPL-MCNC: 257 MG/DL
CO2 SERPL-SCNC: 25 MMOL/L (ref 21–32)
COLOR UR: ABNORMAL
CREAT SERPL-MCNC: 0.73 MG/DL (ref 0.55–1.02)
CREAT UR-MCNC: 109 MG/DL
DIFFERENTIAL METHOD BLD: ABNORMAL
EOSINOPHIL # BLD: 0.4 K/UL (ref 0–0.4)
EOSINOPHIL NFR BLD: 4 % (ref 0–7)
EPITH CASTS URNS QL MICRO: ABNORMAL /LPF
ERYTHROCYTE [DISTWIDTH] IN BLOOD BY AUTOMATED COUNT: 13 % (ref 11.5–14.5)
EST. AVERAGE GLUCOSE BLD GHB EST-MCNC: 117 MG/DL
GLOBULIN SER CALC-MCNC: 3.8 G/DL (ref 2–4)
GLUCOSE SERPL-MCNC: 87 MG/DL (ref 65–100)
GLUCOSE UR STRIP.AUTO-MCNC: NEGATIVE MG/DL
HBA1C MFR BLD: 5.7 % (ref 4–5.6)
HCT VFR BLD AUTO: 46.6 % (ref 35–47)
HCV AB SERPL QL IA: NONREACTIVE
HCV COMMENT,HCGAC: NORMAL
HDLC SERPL-MCNC: 71 MG/DL
HDLC SERPL: 3.6 {RATIO} (ref 0–5)
HGB BLD-MCNC: 14.6 G/DL (ref 11.5–16)
HGB UR QL STRIP: NEGATIVE
HYALINE CASTS URNS QL MICRO: ABNORMAL /LPF (ref 0–5)
IMM GRANULOCYTES # BLD AUTO: 0 K/UL (ref 0–0.04)
IMM GRANULOCYTES NFR BLD AUTO: 0 % (ref 0–0.5)
KETONES UR QL STRIP.AUTO: NEGATIVE MG/DL
LDLC SERPL CALC-MCNC: 168 MG/DL (ref 0–100)
LEUKOCYTE ESTERASE UR QL STRIP.AUTO: ABNORMAL
LIPID PROFILE,FLP: ABNORMAL
LYMPHOCYTES # BLD: 3.1 K/UL (ref 0.8–3.5)
LYMPHOCYTES NFR BLD: 33 % (ref 12–49)
MCH RBC QN AUTO: 28 PG (ref 26–34)
MCHC RBC AUTO-ENTMCNC: 31.3 G/DL (ref 30–36.5)
MCV RBC AUTO: 89.3 FL (ref 80–99)
MICROALBUMIN UR-MCNC: 3.23 MG/DL
MICROALBUMIN/CREAT UR-RTO: 30 MG/G (ref 0–30)
MONOCYTES # BLD: 0.6 K/UL (ref 0–1)
MONOCYTES NFR BLD: 6 % (ref 5–13)
NEUTS SEG # BLD: 5.2 K/UL (ref 1.8–8)
NEUTS SEG NFR BLD: 57 % (ref 32–75)
NITRITE UR QL STRIP.AUTO: NEGATIVE
NRBC # BLD: 0 K/UL (ref 0–0.01)
NRBC BLD-RTO: 0 PER 100 WBC
PH UR STRIP: 5.5 [PH] (ref 5–8)
PLATELET # BLD AUTO: 208 K/UL (ref 150–400)
PMV BLD AUTO: 10.5 FL (ref 8.9–12.9)
POTASSIUM SERPL-SCNC: 4.1 MMOL/L (ref 3.5–5.1)
PROT SERPL-MCNC: 7.7 G/DL (ref 6.4–8.2)
PROT UR STRIP-MCNC: NEGATIVE MG/DL
RBC # BLD AUTO: 5.22 M/UL (ref 3.8–5.2)
RBC #/AREA URNS HPF: ABNORMAL /HPF (ref 0–5)
SODIUM SERPL-SCNC: 139 MMOL/L (ref 136–145)
SP GR UR REFRACTOMETRY: 1.02 (ref 1–1.03)
TRIGL SERPL-MCNC: 90 MG/DL (ref ?–150)
TSH SERPL DL<=0.05 MIU/L-ACNC: 2.36 UIU/ML (ref 0.36–3.74)
UA: UC IF INDICATED,UAUC: ABNORMAL
UROBILINOGEN UR QL STRIP.AUTO: 0.2 EU/DL (ref 0.2–1)
VLDLC SERPL CALC-MCNC: 18 MG/DL
WBC # BLD AUTO: 9.4 K/UL (ref 3.6–11)
WBC URNS QL MICRO: ABNORMAL /HPF (ref 0–4)

## 2020-09-15 PROCEDURE — G9717 DOC PT DX DEP/BP F/U NT REQ: HCPCS | Performed by: NURSE PRACTITIONER

## 2020-09-15 PROCEDURE — 3017F COLORECTAL CA SCREEN DOC REV: CPT | Performed by: NURSE PRACTITIONER

## 2020-09-15 PROCEDURE — G8419 CALC BMI OUT NRM PARAM NOF/U: HCPCS | Performed by: NURSE PRACTITIONER

## 2020-09-15 PROCEDURE — 90715 TDAP VACCINE 7 YRS/> IM: CPT

## 2020-09-15 PROCEDURE — 99396 PREV VISIT EST AGE 40-64: CPT | Performed by: NURSE PRACTITIONER

## 2020-09-15 PROCEDURE — G8753 SYS BP > OR = 140: HCPCS | Performed by: NURSE PRACTITIONER

## 2020-09-15 PROCEDURE — G8755 DIAS BP > OR = 90: HCPCS | Performed by: NURSE PRACTITIONER

## 2020-09-15 PROCEDURE — 90732 PPSV23 VACC 2 YRS+ SUBQ/IM: CPT

## 2020-09-15 PROCEDURE — G0009 ADMIN PNEUMOCOCCAL VACCINE: HCPCS

## 2020-09-15 RX ORDER — ZOSTER VACCINE RECOMBINANT, ADJUVANTED 50 MCG/0.5
0.5 KIT INTRAMUSCULAR ONCE
Qty: 0.5 ML | Refills: 1 | Status: SHIPPED | OUTPATIENT
Start: 2020-09-15 | End: 2020-09-15

## 2020-09-15 RX ORDER — FLUTICASONE PROPIONATE 50 MCG
2 SPRAY, SUSPENSION (ML) NASAL DAILY
Qty: 16 G | Refills: 0 | Status: SHIPPED | OUTPATIENT
Start: 2020-09-15

## 2020-09-15 RX ORDER — NEBULIZER AND COMPRESSOR
1 EACH MISCELLANEOUS AS NEEDED
Qty: 1 EACH | Refills: 0 | Status: SHIPPED | OUTPATIENT
Start: 2020-09-15

## 2020-09-15 RX ORDER — LISINOPRIL 40 MG/1
TABLET ORAL
Qty: 90 TAB | Refills: 0 | Status: SHIPPED | OUTPATIENT
Start: 2020-09-15 | End: 2020-12-26

## 2020-09-15 RX ORDER — PRAVASTATIN SODIUM 40 MG/1
TABLET ORAL
Qty: 90 TAB | Refills: 0 | Status: SHIPPED | OUTPATIENT
Start: 2020-09-15 | End: 2020-09-17 | Stop reason: ALTCHOICE

## 2020-09-15 RX ORDER — ALBUTEROL SULFATE 90 UG/1
AEROSOL, METERED RESPIRATORY (INHALATION)
Qty: 18 G | Refills: 1 | Status: SHIPPED | OUTPATIENT
Start: 2020-09-15 | End: 2020-11-23

## 2020-09-15 RX ORDER — CEPHALEXIN 500 MG/1
500 CAPSULE ORAL 3 TIMES DAILY
Qty: 30 CAP | Refills: 0 | Status: SHIPPED | OUTPATIENT
Start: 2020-09-15 | End: 2020-09-25

## 2020-09-15 RX ORDER — LORATADINE 10 MG/1
10 TABLET ORAL DAILY
Qty: 90 TAB | Refills: 1 | Status: SHIPPED | OUTPATIENT
Start: 2020-09-15

## 2020-09-15 RX ORDER — IPRATROPIUM BROMIDE AND ALBUTEROL SULFATE 2.5; .5 MG/3ML; MG/3ML
3 SOLUTION RESPIRATORY (INHALATION)
Qty: 30 NEBULE | Refills: 1 | Status: SHIPPED | OUTPATIENT
Start: 2020-09-15 | End: 2020-11-23

## 2020-09-15 NOTE — PROGRESS NOTES
S: Jaime Desir is a 63 y.o. BLACK OR  female who presents for     Assessment/Plan:    1. Well Woman exam (no gyn)  -discussed healthy diet and increasing daily exercise  -labs today: CBC, CMP, urinalysis, A1c, lipid, Vit D, TSH, Hep C  -HM: Tdap and PNA 23 today, rx for shingrix given, will get flu in Oct    2. Essential hypertension  current therapy: lisinopril 20mg  -BP today: 153/96  -INCREASE lisinopril to 40mg  -advised to monitor BP at home and keep log; goal<130/80     3. Hyperlipidemia, unspecified hyperlipidemia type  current therapy: pravastatin 40mg  -LIPIDs    4. Facial abscess  -right lower chin: 4cm round, erythemateous, firm mass, ttp. No discharge noted  -rx: keflex 500mg tid x10 days  - REFERRAL TO DERMATOLOGY - advised to discuss abn skin on bilat legs as well    5. Chronic obstructive pulmonary disease with acute exacerbation (HCC)  -current therapy: spiriva + albuterol prn- pt can't afford spiriva anymore  -CHANGE: anoro 1 puff daily (pt to have pharmacy send alternatives if can't afford this one)  - hard rx for nebulizer machine given    -6. Allergies   current therapy: loratadine 10mg + flonase    7.  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 and under a lot of strain; advised recommended amount is 1 serving and discussed cutting back on intake    : recently started bleeding after 10 years of no menses; has gyn appt on 9/17/20, pt to discuss RLQ pain, abn bleeding, Dexa and mammo     Social history:   Nutrition: eating poorly, will eat well with daughter,   Drinking beer, wine (in a week will drink 12 beers/week, cocktails on weekend) states she isn't going to cut back  Physical: none  Social: accompanied by daughter today   Occupation:  Disability - mental issues     Social History     Tobacco Use   Smoking Status Current Every Day Smoker    Packs/day: 1.00    Types: Cigarettes    Start date: 1978   Smokeless Tobacco Never Used   Tobacco Comment    1 ppd  started age 11. Working on using E-cigs to quit     Social History     Substance and Sexual Activity   Alcohol Use Yes    Comment: socially, usually 2 at a time. max 3 at a time      Social History     Substance and Sexual Activity   Drug Use No    Comment: remote h/o drug use in early 2000s - cocaine, MJ     Social History     Substance and Sexual Activity   Sexual Activity Not Currently    Partners: Male       Patient's last menstrual period was 03/01/2015. Review of Systems:  - Constitutional Symptoms: no fevers/chills  - Eyes: no blurry vision or double vision  - Cardiovascular: no chest pain or palpitations  - Respiratory: no cough or shortness of breath  - Gastrointestinal: no dysphagia or abdominal pain  - Musculoskeletal: no joint pains or weakness  - Integumentary: no rashes  - Neurological: no numbness, tingling, or headaches  - Psychiatric: managed by counseling and psych  - Endocrine:  no heat or cold intolerance, no polyuria or polydipsia  ROS is negative otherwise.   I reviewed the following:  Past Medical History:   Diagnosis Date    Anxiety     Arthritis     Bipolar disorder ([facility]     bi polar Dr. Gayatri Marmolejo has dismissed her as a pt    Cocaine abuse ([facility] [address] 10/12    Based on ER urine tox screen    COPD     [facility]ative identity disorder [facility])     Drug overdose, intentional ([facility] [address] [facility]     Drug-induced delirium 11/11/2016    Fibromyalgia 6/2007    [facility] Kryst[facility] Prader [phone], [phone]    Fracture of lumbar spine ([address]     Found on MRI    Hyperlipidemia 9/29/2011    Hypertension     Migraines     Multiple personality disorder ([address]     Neuropathy     Radicular neuropathy [facility] Cristina Fitzgerald at [facility]. Neck and back.  Suicidal ideations     [facility] 11/6/2016    Tobacco abuse     Vitamin D deficiency        Current Outpatient Medications   Medication Sig Dispense Refill    hydrOXYzine HCL (ATARAX) 25 mg tablet TAKE ONE TABLET BY MOUTH EVERY 8 HOURS AS NEEDED FOR ITCHING. 90 Tab 0    fluticasone propionate (FLONASE) 50 mcg/actuation nasal spray 2 Sprays by Both Nostrils route daily. 16 g 0    famotidine (PEPCID) 20 mg tablet TAKE ONE TABLET BY MOUTH EVERY NIGHT. - FOR ACID REFLUX 30 Tab 0    loratadine (CLARITIN) 10 mg tablet Take 1 Tab by mouth daily. 90 Tab 1    tiotropium (Spiriva with HandiHaler) 18 mcg inhalation capsule USE 1 CAPSULE DAILY, VIA INHALER DEVICE 30 Cap 2    pravastatin (PRAVACHOL) 40 mg tablet TAKE ONE TABLET BY MOUTH EVERY DAY -LOWER CHOLESTEROL 90 Tab 0    albuterol (Ventolin HFA) 90 mcg/actuation inhaler 2 PUFFS EVERY 4 HOURS AS NEEDED FOR WHEEZING OR SHORTNESS OF BREATH 18 g 1    Nebulizer & Compressor machine 1 Each by Does Not Apply route as needed for Wheezing or Shortness of Breath. 1 Each 0    albuterol-ipratropium (DUO-NEB) 2.5 mg-0.5 mg/3 ml nebu 3 mL by Nebulization route every six (6) hours as needed (as needed). 30 Nebule 1    cholecalciferol (VITAMIN D3) (1000 Units /25 mcg) tablet Take 2 Tabs by mouth daily. 180 Tab 1    polyethylene glycol (MIRALAX) 17 gram/dose powder MIX 17 GM (FILL CAP FULL WITH POWDER) WITH 8OZ OF WATER, AND DRINK, ONCE A DAY, FOR CONSTIPATION  Indications: constipation 527 g 0    lisinopriL (PRINIVIL, ZESTRIL) 20 mg tablet Take 1 Tab by mouth daily. - blood pressure 90 Tab 1    QUEtiapine (SEROQUEL) 25 mg tablet Take  by mouth.  DULoxetine (CYMBALTA) 20 mg capsule Take 20 mg by mouth daily.  divalproex DR (DEPAKOTE) 125 mg tablet Take 125 mg by mouth three (3) times daily.  diclofenac EC (VOLTAREN) 75 mg EC tablet Take 1 Tab by mouth two (2) times a day. With food.  20 Tab 0       Allergies Allergen Reactions    Vicodin [Hydrocodone-Acetaminophen] Itching        Health Maintenance:  Eye exam: 2020   PAP: will see gyn this week   Mammogram: ordered 2019, but never done - going to gyn   Colonoscopy: 2011 q10y  Hep C: today   Tdap: today   Flu: discussed   Shingles: rx given 2019  Pneumo 23:  2009    O: VS:   Visit Vitals  BP (!) 153/96 (BP 1 Location: Left arm, BP Patient Position: Sitting)   Pulse 80   Temp 97.1 °F (36.2 °C) (Temporal)   Resp 17   Ht 5' 3\" (1.6 m)   Wt 169 lb 1.6 oz (76.7 kg)   LMP 03/01/2015   SpO2 98%   BMI 29.95 kg/m²       Data Reviewed:  Lorena Monson has gained 10lbs since last visit 1/2019  Labs:   · Hepatitis C (pt born [birth year]): Lab Results   Component Value Date/Time    Hep C Virus Ab <0.1 02/24/2016 03:23 PM     · A1C:      Lab Results   Component Value Date/Time    Hemoglobin A1c 5.6 11/06/2016 07:04 PM     · Lipids:  Lab Results   Component Value Date/Time    Cholesterol, total 153 11/06/2016 06:55 PM    HDL Cholesterol 43 11/06/2016 06:55 PM    LDL, calculated 93.6 11/06/2016 06:55 PM    VLDL, calculated 16.4 11/06/2016 06:55 PM    Triglyceride 82 11/06/2016 06:55 PM    CHOL/HDL Ratio 3.6 11/06/2016 06:55 PM       GENERAL: Lorena Monson is in no acute distress. Non-toxic. Well nourished. Well developed. Appropriately groomed. HEAD:  Normocephalic. Atraumatic. Non tender sinuses x 4. EYE: PERRLA. EOMs intact. Sclera anicteric without injection. No drainage or discharge. EARS: Hearing intact bilaterally. External ear canals normal without evidence of blood or swelling. Bilateral TM's intact, pearly grey with landmarks visible. No erythema or effusion. NOSE: Patent. Nasal turbinates pink. No erythema. No discharge. MOUTH: mucous membranes pink and moist. Posterior pharynx normal with cobblestone appearance. No erythema, white exudate or obstruction. NECK: supple. Midline trachea. No carotid bruits noted bilaterally. No thyromegaly noted.   No cervical adenopathy noted. RESP: Unlabored without SOB. Speaking in full sentences. Breath sounds are symmetrical bilaterally. Clear to auscultation bilaterally anteriorly and posteriorly. No wheeze, crackles or rhonchi noted. CV: normal rate. Regular rhythm. S1, S2 audible. No murmur noted. No rubs, clicks or gallops noted. ABDOMEN: Flat without bulges or pulsations. Soft and nondistended. No masses or organomegaly. No rebound, rigidity or guarding. Bowel sounds normal x 4 quadrants. RLQ near ovary = tenderness on palpation  BACK: No visible deformities or curvature. Full ROM. No pain on palpation of the spinous processes in the cervical, thoracic, lumbar, sacral regions. No CVA tenderness. NEURO:  awake, alert and oriented to person, place, and time and event. Cranial nerves II through XII intact. Clear speech. Muscle strength is +5/5 x 4 extremities. Sensation is intact to light touch bilaterally. Steady gait. MUSC:  Intact x 4 extremities. Full ROM x 4 extremities. No pain with movement. Patellar reflexes 2+  HEME/LYMPH: peripheral pulses palpable 2+ x 4 extremities. No peripheral edema is noted. SKIN: Skin is warm and dry. Turgor is normal.  -right lower chin: 4cm round, erythemateous, firm mass, ttp. No discharge noted  -bilat lower legs w/hypopigmented spots scattered          ______________________________________________________________________  Patient education was done. Advised on nutrition, physical activity, weight management, tobacco, alcohol and safety. Counseling included discussion of diagnosis, differentials, treatment options, prescribed treatment, warning signs and follow up. Medication risks/benefits,interactions and alternatives discussed with patient. Patient verbalized understanding and agreed to plan of care. Patient was given an after visit summary which included current diagnoses, medications and vital signs. Follow up as directed.

## 2020-09-15 NOTE — PATIENT INSTRUCTIONS
1) Healthy Weight Body Mass Index is a noninvasive way to screen for weight and body fat. This is a mathematical calculation based on your height and weight. A healthy BMI is between 20% -24.9%. Your BMI is 30 %. There is a relationship between high BMI and various healthy problems, such as osteoarthritis, muscle pain, increased risk of cancer, diabetes, heart disease, stroke, hypertension, high cholesterol, sleep apnea, breathing problems, depression, which is why a healthy BMI is so important. In terms of weight loss, a 5-10% reduction in body weight over 3-6 months is a reasonable goal.  There would likely be improvements in risk for disease such as diabetes and heart disease, with this amount of weight loss. In order to lose weight, try reducing your daily intake of calories by decreasing portion size of food and increase exercise to help reduce weight. Eat 3-5 small meals per day instead of 3 large meals. A good tip to losing weight : DON'T EAT OR DRINK ANYTHING AFTER DINNER! Choose lean meats for protein source which include chicken, pork, and turkey. The recommended serving size for protein is a 2-3 oz serving (the size of your fist), and 1-1.5 oz of carbohydrate per meal (about 1 cup). Increase servings of fruits and vegetables. Limit processed carbohydrates, (i.e. most breads, crackers, pasta, chips, rice, breaded or battered food, etc). If you choose to eat carbohydrates, whole wheat, (instead of white) is a healthier option for bread, rice, and crackers. Avoid fried foods. Limit sugar. Do your best to avoid all sweetened beverages, such as alcohol, juice, sodas or sweet teas, drink water, unsweet tea, diet soda, or crystal light as options instead. (Don't drink more than 2 of the 12oz artificially sweetened drinks per day as these can increase hunger and make it more difficult to lose weight. The daily goal for water intake should be at least 64 oz/day for most people. Daily exercise will also help with weight loss and overall health. A minimum of 150 minutes a week of moderate exercise is recommended (30 minutes per day). Make exercise a routine part of your day - for example, park in spaces far away from [facility], take stairs instead of elevator, if sitting for long periods, get up from chair and walk every hour. Recruit a friend or relative to exercise with you and keep you on schedule. It is much easier to exercise with a finesse who will make sure you work out each day! Home DVDs can be a great way to work out, if you will do them (otherwise, they aren't worth the money!) Shoozy is a eight week mixed martial arts (MMA) based workout. It has 5 main workout DVDs, each one is 45 minutes consisting of a 10 minute warm-up, five 5 minute workouts and a 6 minute stretching/cool down. It is easy to follow, with options to modify each move and you only need hand weights and some space to do the work out. Meal planning smart phone applications like buySAFE can help plan healthy meals. Get 7-8 hours of sleep each night. For reliable dietary information, go to www. EATRIGHT.org. 
 
You may wish to consider seeing the nutritionist at [facility] ([phone]), [facility] ([phone]) or [place] ([phone]). Alternatively, you can dial [employer] at [phone] (Monday - Friday 9am - 5pm) for a complimentary (free) conversation about your diet. Meal plans, grocery list and tips for healthy eating can be sent to you upon request.  
 
Free smart phone application to help manage weight loss: MyFitnessPal = tracks food intake, exercise and weight. Daily nutritional summary. Meal  2) Increase lisinopril to 40mg daily  Make an appt in 2 weeks to recheck blood pressure. Please check your blood pressure through the week at staggered times in the day.  (If you check in the morning, it should be at least one hour after your morning blood pressure medications.) Arm monitors are most accurate. If you use a wrist monitor, make sure your wrist is at heart level. You can bring your home monitor to your next visit and have it calibrated with the machine in the office to gauge your readings. Sit  with your feet uncrossed and relax for 5 minutes before taking your BP. Keep a written record of your blood pressure readings and bring it to each appointment. If your systolic (top) blood pressure is consistently greater than 140mmHg or less than [id number], OR the diastolic (bottom) number is consistently greater than 90mmHg or less than 60mmHg, then please schedule an office appointment. Work on healthy eating - no salt diet, more potassium (helps flush out sodium,making healthier heart and arteries) - and incorporating daily exercise into your routine. Losing weight can help reduce your blood pressure! Cardiac symptoms that would need immediate attention include: uncomfortable pressure, squeezing, fullness or pain in the center of your chest. Pain or discomfort in one or both arms, the back, neck, jaw or stomach. Shortness of breath with or without chest discomfort, breaking out in a cold sweat, nausea or lightheadedness. 3) Shingles Vaccine - Shingrix Herpes Zoster (Shingles) Herpes zoster (shingles) is a painful rash caused by the same virus that causes chickenpox. After an episode of chickenpox, the virus retreats to cells of the nervous system, where it can reside quietly for decades. However, later in life, the varicella-zoster virus can become active again. When it reactivates, it causes shingles. Shingles can affect people of all ages. It is particularly common in adults over age 48 years. It is also more common in individuals of all ages with conditions that weaken the immune system. Pain is usually the first sign of shingles.   Other symptoms include: fever, chills, headache, numbness, tingling and/or burning pain and a skin rash. The rash can appear anywhere on your body, but most commonly on the torso. It is usually on only 1 side of your body, in a band or beltlike pattern. During the first several days, small, painful blisters appear in the area. Scarring may occur where the blisters appear and there may be continued sensitivity and pain in that patch of skin for months after the infection. Treatment:  
There is no cure for shingles - it is usually self-limiting. However, anti-viral medications can reduce the spread of the rash, speed healing and decrease the risk of complications. Supportive Treatment:  
1)  Tylenol or ibuprofen can be used to reduce pain 2) Colloidal oatmeal bath or wet cool compresses may help with itching. Make a solution of cool water and cornstarch, baking soda, or Aveeno Oatmeal.  Apply the solution as a compress to the area. This will soothe the skin. 3) Wash the skin with soap and water to keep rash free of infection. 4) Reduce stress - exercise, yoga, healthy diet THE BLISTER FLUID IS CONTAGIOUS TO ANYONE WHO HAS NOT ALREADY HAD CHICKEN POX. Stay home from work or school until all blisters have formed a crust (usually 2 weeks after the illness begins) and you are no longer contagious. Prevention: The CDC recommends everyone over the age of 61 receive the shingles vaccine. This is recommended for people who have already had a shingles outbreak as it could prevent future occurrences of the disease. According to the CDC, it is also recommended for people born before [birth year] in the [address] [place] [address] who have not had varicella (chicken pox) as they are considered immune. [facility] is a vaccine indicated for prevention of herpes zoster (shingles) in adults aged 48 years and older and is the preferred vaccine for preventing shingles and related complications The Center for Disease Control and Prevention recommended Shingrix for the following: 
- healthy adults aged 48 years and older to prevent shingles and related complications 
- adults who previously received the current shingles vaccine (Zostavax®) to prevent shingles and related complications Two doses (0.5 mL each) are needed for full efficacy. The vaccines are administered intramuscularly, with the second one administered 2-6 months after the initial vaccine. 4) Talk to the gynecologist this week at your appt about Dexa scan, right lower ab pain - seems to be in ovary section; mammogram and pap smear as well as abnormal uterine bleeding 5) Labs The following blood work was ordered today. Once the tests are completed, you will receive a letter, email or phone call with the results. If you have not heard from us within 10-14 days, please call the office for the results. Complete Blood Count (CBC) helps evaluate your overall health, including hemoglobin and red blood cells that carry oxygen, white blood cells that fight infection and platelets that help with clotting. Complete Metabolic Panel (CMP) assesses electrolytes, kidney and liver function.  (A Basic Metabolic Panel (BMP) does not include liver function.) Electrolytes include sodium, potassium, calcium, and chloride. These are necessary for cell function and an imbalance can cause serious problems. BUN and creatinine are markers of kidney function. ALT and AST are markers of liver function. Hemoglobin A1c is a 3 month average of your blood sugar and used as a marker of your diabetes control. A normal value is less than 5.7%. Total Cholesterol is the total number of cholesterol particles in your blood, which includes triglycerides, HDL and LDL. A small amount of cholesterol is needed for the body's cells, hormones, and metabolism. Goal is less than 200. Triglycerides are the short term fats in your blood which are used for energy. Goal is less than 150. High Density Lipids (HDL) is the \"good\" cholesterol in your blood. HDL helps remove bad cholesterol from your blood. Goal is more than 40. Low Density Lipids (LDL) is the \"bad\" cholesterol in your blood. LDL can stick to your arteries, raising the risk for heart attack and stroke. Goal is less than 100 Urinalysis - this is a test of your urine to check your overall health. It is recommended as a part of a routine check up and screens for a variety of disorders, such as urinary tract infections, kidney disease and diabetes. Your thyroid is responsible for secreting hormones and regulating your metabolism. Thyroid Stimulating Hormone (TSH) is a test for thyroid function. TSH is a hormone made by the pituitary gland in the brain and tells your thyroid gland to make hormones and release them into your blood. If your thyroid isn't producing enough hormone, you may have symptoms such as unexplained weight gain, fatigue, hair loss. If your thyroid is producing too much hormone, you may have symptoms of diarrhea, heart palpitations, fatigue, unexplained weight loss. A normal TSH value is between 0.45 - 4.5. Urine Analysis for Microalbumin/creatinine ratio is a marker of the amount of protein in your urine. Certain health conditions, such as diabetes and hypertension, can injury kidney function. A normal value is less than 30. Vitamin D is important for absorbing calcium and promoting bone growth. If you are low in Vitamin D, you may experience fatigue, back or bone pain, hair loss, muscle pain, slow wound healing, depression. Your body can make Vitamin D after exposure to sunlight or through foods like fatty fish (tuna, mackerel, salmon), food with Vitamin D added (dairy products, orange juice and cereals) and cheese, egg yolks and liver. Vitamin D decreases with age and in the winter time when the sun is not strong. A deficiency in Vitamin D has been linked to certain cancers (breast, prostate, colon) as well as heart disease, depression and weight gain. Hepatitis C is caused by a virus that attacks the liver and causes inflammation and can lead to serious liver damage. Hepatitis C is spread through contaminated blood. This disease is now curable with medications, and the [facility] Preventive Services Task Force (USPSTF) recommends screening one time for anyone born between 8315-9734 as well as high risk populations, (risk factors include: IV drug use, HIV positive, tattoo or piercings from unclean instruments, etc) 6) Please take the keflex 500mg three times a day for 10 days for abscess on chin . This is an antibiotic and you should take all of it as directed until it is complete. If you have any kind of reaction - itching, shortness of breath, etc, please stop the antibiotic immediately and call the office. Please make an appointment with [facility], ASTER [place] 
[address] [place] [address], [address] [facility] Phone: [phone] 
 
7) Alcohol intake - Please reduce alcohol intake to no more than 2 servings of alcohol a day (one servin oz beer, 5 oz wine, 1.5 oz distilled spirits (80 proof) and discontinue or reduce usage of OTC pain relievers. 8) Influenza vaccine. The influenza virus mutates (changes) each year, so a new vaccine is necessary every , before flu season The flu season in [place] usually starts in [address], so I recommend getting the vaccine in October as the vaccine immunity only lasts 3-4 months. If you get it too early, it will wear off before our flu season starts.   However, if you have underlying conditions that make you part of the vulnerable population or if you hear the flu is here early, then go ahead and get vaccine. It takes 2 weeks for vaccine to be fully effective. People over 72years old get a high dose flu vaccine and cannot do the nasal vaccine. Side effects can include pain, redness or swelling at the injection site, headache, muscle ache and malaise, and typically resolve with 1 to 3 days. Learning About the [address] [facility] Finomial Diet What is the Mediterranean diet? The Mediterranean diet is a style of eating rather than a diet plan. It features foods eaten in [place], [place], [place] and [place], and other countries along the [facility]. It emphasizes eating foods like fish, fruits, vegetables, beans, high-fiber breads and whole grains, nuts, and olive oil. This style of eating includes limited red meat, cheese, and sweets. Why choose the Mediterranean diet? A Mediterranean-style diet may improve heart health. It contains more fat than other heart-healthy diets. But the fats are mainly from nuts, unsaturated oils (such as fish oils and olive oil), and certain nut or seed oils (such as canola, soybean, or flaxseed oil). These fats may help protect the heart and blood vessels. How can you get started on the Mediterranean diet? Here are some things you can do to switch to a more Mediterranean way of eating. What to eat · Eat a variety of fruits and vegetables each day, such as grapes, blueberries, tomatoes, broccoli, peppers, figs, olives, spinach, eggplant, beans, lentils, and chickpeas. · Eat a variety of whole-grain foods each day, such as oats, brown rice, and whole wheat bread, pasta, and couscous. · Eat fish at least 2 times a week. Try tuna, salmon, mackerel, lake trout, herring, or sardines. · Eat moderate amounts of low-fat dairy products, such as milk, cheese, or yogurt. · Eat moderate amounts of poultry and eggs. · Choose healthy (unsaturated) fats, such as nuts, olive oil, and certain nut or seed oils like canola, soybean, and flaxseed. · Limit unhealthy (saturated) fats, such as butter, palm oil, and coconut oil. And limit fats found in animal products, such as meat and dairy products made with whole milk. Try to eat red meat only a few times a month in very small amounts. · Limit sweets and desserts to only a few times a week. This includes sugar-sweetened drinks like soda. The Mediterranean diet may also include red wine with your meal1 glass each day for women and up to 2 glasses a day for men. Tips for eating at home · Use herbs, spices, garlic, lemon zest, and citrus juice instead of salt to add flavor to foods. · Add avocado slices to your sandwich instead of castaneda. · Have fish for lunch or dinner instead of red meat. Brush the fish with olive oil, and broil or grill it. · Sprinkle your salad with seeds or nuts instead of cheese. · Cook with olive or canola oil instead of butter or oils that are high in saturated fat. · Switch from 2% milk or whole milk to 1% or fat-free milk. · Dip raw vegetables in a vinaigrette dressing or hummus instead of dips made from mayonnaise or sour cream. 
· Have a piece of fruit for dessert instead of a piece of cake. Try baked apples, or have some dried fruit. Tips for eating out · Try broiled, grilled, baked, or poached fish instead of having it fried or breaded. · Ask your  to have your meals prepared with olive oil instead of butter. · Order dishes made with marinara sauce or sauces made from olive oil. Avoid sauces made from cream or mayonnaise. · Choose whole-grain breads, whole wheat pasta and pizza crust, brown rice, beans, and lentils. · Cut back on butter or margarine on bread. Instead, you can dip your bread in a small amount of olive oil. · Ask for a side salad or grilled vegetables instead of french fries or chips. Where can you learn more? Go to http://leonie-deborah.info/ Enter O407 in the search box to learn more about \"Learning About the Mediterranean Diet. \" Current as of: August 22, 2019               Content Version: 12.6 © 9079-9711 [employer]. Care instructions adapted under license by [employer] (which disclaims liability or warranty for this information). If you have questions about a medical condition or this instruction, always ask your healthcare professional. [address] any warranty or liability for your use of this information. Vaccine Information Statement Pneumococcal Polysaccharide Vaccine (PPSV23): What You Need to Know Many Vaccine Information Statements are available in Somali and other languages. See www.immunize.org/vis Hojas de información sobre vacunas están disponibles en español y en muchos otros idiomas. Visite www.immunize.org/vis 1. Why get vaccinated? Pneumococcal polysaccharide vaccine (PPSV23) can prevent pneumococcal disease. Pneumococcal disease refers to any illness caused by pneumococcal bacteria. These bacteria can cause many types of illnesses, including pneumonia, which is an infection of the lungs. Pneumococcal bacteria are one of the most common causes of pneumonia. Besides pneumonia, pneumococcal bacteria can also cause: 
 Ear infections  Sinus infections  Meningitis (infection of the tissue covering the brain and spinal cord)  Bacteremia (bloodstream infection) Anyone can get pneumococcal disease, but children under 3years of age, people with certain medical conditions, adults 72 years or older, and cigarette smokers are at the highest risk. Most pneumococcal infections are mild. However, some can result in long-term problems, such as brain damage or hearing loss. Meningitis, bacteremia, and pneumonia caused by pneumococcal disease can be fatal.  
 
2. PPSV23  
 
PPSV23 protects against 23 types of bacteria that cause pneumococcal disease. PPSV23 is recommended for:  All adults 72 years or older, 
  Anyone 2 years or older with certain medical conditions that can lead to an increased risk for pneumococcal disease. Most people need only one dose of PPSV23. A second dose of PPSV23, and another type of pneumococcal vaccine called PCV13, are recommended for certain high-risk groups. Your health care provider can give you more information. People 65 years or older should get a dose of PPSV23 even if they have already gotten one or more doses of the vaccine before they turned 72. 
 
3. Talk with your health care provider Tell your vaccine provider if the person getting the vaccine: 
 Has had an allergic reaction after a previous dose of PPSV23, or has any severe, life-threatening allergies. In some cases, your health care provider may decide to postpone PPSV23 vaccination to a future visit. People with minor illnesses, such as a cold, may be vaccinated. People who are moderately or severely ill should usually wait until they recover before getting PPSV23. Your health care provider can give you more information. 4. Risks of a vaccine reaction  Redness or pain where the shot is given, feeling tired, fever, or muscle aches can happen after PPSV23. People sometimes faint after medical procedures, including vaccination. Tell your provider if you feel dizzy or have vision changes or ringing in the ears. As with any medicine, there is a very remote chance of a vaccine causing a severe allergic reaction, other serious injury, or death. 5. What if there is a serious problem? An allergic reaction could occur after the vaccinated person leaves the clinic. If you see signs of a severe allergic reaction (hives, swelling of the face and throat, difficulty breathing, a fast heartbeat, dizziness, or weakness), call 9-1-1 and get the person to the nearest hospital. 
 
For other signs that concern you, call your health care provider. Adverse reactions should be reported to the Vaccine Adverse Event Reporting System (VAERS). Your health care provider will usually file this report, or you can do it yourself. Visit the VAERS website at www.vaers. hhs.gov or call 9-722.938.3773. VAERS is only for reporting reactions, and VAERS staff do not give medical advice. 6. How can I learn more?  Ask your health care provider.  Call your local or state health department.  Contact the Centers for Disease Control and Prevention (CDC): 
- Call 9-183.262.6850 (1-800-CDC-INFO) or 
- Visit CDCs website at www.cdc.gov/vaccines Vaccine Information Statement PPSV23  
10/30/2019 [facility] and [employer] Centers for Disease Control and Prevention Office Use Only Vaccine Information Statement Tdap (Tetanus, Diphtheria, Pertussis) Vaccine: What you need to know Many Vaccine Information Statements are available in Macedonian and other languages. See www.immunize.org/vis Hojas de información sobre vacunas están disponibles en español y en muchos otros idiomas. Visite www.immunize.org/vis 1. Why get vaccinated? Tdap vaccine can prevent tetanus, diphtheria, and pertussis. Diphtheria and pertussis spread from person to person. Tetanus enters the body through cuts or wounds.  TETANUS (T) causes painful stiffening of the muscles. Tetanus can lead to serious health problems, including being unable to open the mouth, having trouble swallowing and breathing, or death.  DIPHTHERIA (D) can lead to difficulty breathing, heart failure, paralysis, or death.  PERTUSSIS (aP), also known as whooping cough, can cause uncontrollable, violent coughing which makes it hard to breathe, eat, or drink. Pertussis can be extremely serious in babies and young children, causing pneumonia, convulsions, brain damage, or death.   In teens and adults, it can cause weight loss, loss of bladder control, passing out, and rib fractures from severe coughing. 2. Tdap vaccine Tdap is only for children 7 years and older, adolescents, and adults. Adolescents should receive a single dose of Tdap, preferably at age 6 or 15 years. Pregnant women should get a dose of Tdap during every pregnancy, to protect the  from pertussis. Infants are most at risk for severe, life-threatening complications from pertussis. Adults who have never received Tdap should get a dose of Tdap. Also, adults should receive a booster dose every 10 years, or earlier in the case of a severe and dirty wound or burn. Booster doses can be either Tdap or Td (a different vaccine that protects against tetanus and diphtheria but not pertussis). Tdap may be given at the same time as other vaccines. 3. Talk with your health care provider Tell your vaccine provider if the person getting the vaccine: 
 Has had an allergic reaction after a previous dose of any vaccine that protects against tetanus, diphtheria, or pertussis, or has any severe, life-threatening allergies.  Has had a coma, decreased level of consciousness, or prolonged seizures within 7 days after a previous dose of any pertussis vaccine (DTP, DTaP, or Tdap).  Has seizures or another nervous system problem.  Has ever had Guillain-Barré Syndrome (also called GBS).  Has had severe pain or swelling after a previous dose of any vaccine that protects against tetanus or diphtheria. In some cases, your health care provider may decide to postpone Tdap vaccination to a future visit. People with minor illnesses, such as a cold, may be vaccinated. People who are moderately or severely ill should usually wait until they recover before getting Tdap vaccine. Your health care provider can give you more information. 4. Risks of a vaccine reaction  Pain, redness, or swelling where the shot was given, mild fever, headache, feeling tired, and nausea, vomiting, diarrhea, or stomachache sometimes happen after Tdap vaccine. People sometimes faint after medical procedures, including vaccination. Tell your provider if you feel dizzy or have vision changes or ringing in the ears. As with any medicine, there is a very remote chance of a vaccine causing a severe allergic reaction, other serious injury, or death. 5. What if there is a serious problem? An allergic reaction could occur after the vaccinated person leaves the clinic. If you see signs of a severe allergic reaction (hives, swelling of the face and throat, difficulty breathing, a fast heartbeat, dizziness, or weakness), call 9-1-1 and get the person to the nearest hospital. 
 
For other signs that concern you, call your health care provider. Adverse reactions should be reported to the Vaccine Adverse Event Reporting System (VAERS). Your health care provider will usually file this report, or you can do it yourself. Visit the VAERS website at www.vaers. hhs.gov or call 8-632.905.2535. VAERS is only for reporting reactions, and VAERS staff do not give medical advice. 6. The National Vaccine Injury Compensation Program 
 
The [facility] Vaccine Injury Compensation Program (VICP) is a federal program that was created to compensate people who may have been injured by certain vaccines. Visit the VICP website at www.hrsa.gov/vaccinecompensation or call 0-271.506.4796 to learn about the program and about filing a claim. There is a time limit to file a claim for compensation. 7. How can I learn more?  Ask your health care provider.  Call your local or state health department.  Contact the Centers for Disease Control and Prevention (CDC): 
- Call 8-290.738.8068 (1-800-CDC-INFO) or 
- Visit CDCs website at www.cdc.gov/vaccines Vaccine Information Statement (Interim) Tdap (Tetanus, Diphtheria, Pertussis) Vaccine 04/01/2020 
[address] 354TZ-35 Department of Health and [employer] Centers for Disease Control and Prevention Office Use Only

## 2020-09-15 NOTE — TELEPHONE ENCOUNTER
Called in Keflex to [facility] on [address]. Canceld at [employer].

## 2020-09-17 RX ORDER — ATORVASTATIN CALCIUM 80 MG/1
80 TABLET, FILM COATED ORAL DAILY
Qty: 90 TAB | Refills: 1 | Status: SHIPPED | OUTPATIENT
Start: 2020-09-17 | End: 2021-03-15 | Stop reason: SDUPTHER

## 2020-09-17 RX ORDER — ERGOCALCIFEROL 1.25 MG/1
50000 CAPSULE ORAL
Qty: 12 CAP | Refills: 0 | Status: SHIPPED | OUTPATIENT
Start: 2020-09-17

## 2020-09-17 NOTE — PROGRESS NOTES
D = 23, rx 50k units q7dasy, x12 wks; LDL = 168, change from pravastatin to atorvastatin. Result ltr mailed.

## 2020-09-24 ENCOUNTER — HOSPITAL ENCOUNTER (OUTPATIENT)
Dept: MRI IMAGING | Age: 55
Discharge: HOME | End: 2020-09-24
Attending: ORTHOPAEDIC SURGERY
Payer: MEDICARE

## 2020-09-24 DIAGNOSIS — M47.22 OSTEOARTHRITIS OF SPINE WITH RADICULOPATHY, CERVICAL REGION: ICD-10-CM

## 2020-09-24 DIAGNOSIS — Z98.1 STATUS POST CERVICAL SPINAL FUSION: ICD-10-CM

## 2020-09-24 DIAGNOSIS — M54.2 NECK PAIN: ICD-10-CM

## 2020-09-24 DIAGNOSIS — M54.12 CERVICAL RADICULOPATHY: ICD-10-CM

## 2020-09-24 PROCEDURE — 72141 MRI NECK SPINE W/O DYE: CPT

## 2020-09-30 ENCOUNTER — TELEPHONE (OUTPATIENT)
Dept: FAMILY MEDICINE CLINIC | Age: 55
End: 2020-09-30

## 2020-09-30 NOTE — TELEPHONE ENCOUNTER
The patient said that she was just seen recently 9/15/2020 by Terrance Mac. The patient said that she has developed a cough and some congestion. The patient would like a medication called into her pharmacy if possible. The patient also has an appointment on 10/13/2020. Please advise if patient need an appointment.  Please call [phone]

## 2020-10-02 ENCOUNTER — HOSPITAL ENCOUNTER (OUTPATIENT)
Dept: PREADMISSION TESTING | Age: 55
Discharge: HOME | End: 2020-10-02
Payer: MEDICARE

## 2020-10-02 ENCOUNTER — TRANSCRIBE ORDER (OUTPATIENT)
Dept: REGISTRATION | Age: 55
End: 2020-10-02

## 2020-10-02 DIAGNOSIS — Z01.812 PRE-PROCEDURE LAB EXAM: Primary | ICD-10-CM

## 2020-10-02 DIAGNOSIS — Z01.812 PRE-PROCEDURE LAB EXAM: ICD-10-CM

## 2020-10-02 PROCEDURE — 87635 SARS-COV-2 COVID-19 AMP PRB: CPT

## 2020-10-03 LAB — SARS-COV-2, COV2NT: NOT DETECTED

## 2020-10-06 ENCOUNTER — ANESTHESIA EVENT (OUTPATIENT)
Dept: ENDOSCOPY | Age: 55
End: 2020-10-06
Payer: MEDICARE

## 2020-10-06 ENCOUNTER — ANESTHESIA (OUTPATIENT)
Dept: ENDOSCOPY | Age: 55
End: 2020-10-06
Payer: MEDICARE

## 2020-10-06 ENCOUNTER — HOSPITAL ENCOUNTER (OUTPATIENT)
Age: 55
Setting detail: OUTPATIENT SURGERY
Discharge: HOME | End: 2020-10-06
Attending: SPECIALIST | Admitting: SPECIALIST
Payer: MEDICARE

## 2020-10-06 VITALS
DIASTOLIC BLOOD PRESSURE: 96 MMHG | HEART RATE: 95 BPM | WEIGHT: 169 LBS | OXYGEN SATURATION: 100 % | SYSTOLIC BLOOD PRESSURE: 116 MMHG | HEIGHT: 63 IN | RESPIRATION RATE: 12 BRPM | TEMPERATURE: 97.7 F | BODY MASS INDEX: 29.95 KG/M2

## 2020-10-06 PROCEDURE — 74011000250 HC RX REV CODE- 250: Performed by: NURSE ANESTHETIST, CERTIFIED REGISTERED

## 2020-10-06 PROCEDURE — 2709999900 HC NON-CHARGEABLE SUPPLY: Performed by: SPECIALIST

## 2020-10-06 PROCEDURE — 88305 TISSUE EXAM BY PATHOLOGIST: CPT

## 2020-10-06 PROCEDURE — 74011250636 HC RX REV CODE- 250/636: Performed by: NURSE ANESTHETIST, CERTIFIED REGISTERED

## 2020-10-06 PROCEDURE — 88342 IMHCHEM/IMCYTCHM 1ST ANTB: CPT

## 2020-10-06 PROCEDURE — 77030021593 HC FCPS BIOP ENDOSC BSC -A: Performed by: SPECIALIST

## 2020-10-06 PROCEDURE — 76060000031 HC ANESTHESIA FIRST 0.5 HR: Performed by: SPECIALIST

## 2020-10-06 PROCEDURE — 76040000019: Performed by: SPECIALIST

## 2020-10-06 RX ORDER — PROPOFOL 10 MG/ML
INJECTION, EMULSION INTRAVENOUS AS NEEDED
Status: DISCONTINUED | OUTPATIENT
Start: 2020-10-06 | End: 2020-10-06 | Stop reason: HOSPADM

## 2020-10-06 RX ORDER — EPINEPHRINE 0.1 MG/ML
1 INJECTION INTRACARDIAC; INTRAVENOUS
Status: DISCONTINUED | OUTPATIENT
Start: 2020-10-06 | End: 2020-10-06 | Stop reason: HOSPADM

## 2020-10-06 RX ORDER — LIDOCAINE HYDROCHLORIDE 20 MG/ML
INJECTION, SOLUTION EPIDURAL; INFILTRATION; INTRACAUDAL; PERINEURAL AS NEEDED
Status: DISCONTINUED | OUTPATIENT
Start: 2020-10-06 | End: 2020-10-06 | Stop reason: HOSPADM

## 2020-10-06 RX ORDER — ATROPINE SULFATE 0.1 MG/ML
0.5 INJECTION INTRAVENOUS
Status: DISCONTINUED | OUTPATIENT
Start: 2020-10-06 | End: 2020-10-06 | Stop reason: HOSPADM

## 2020-10-06 RX ORDER — SODIUM CHLORIDE 9 MG/ML
50 INJECTION, SOLUTION INTRAVENOUS CONTINUOUS
Status: DISCONTINUED | OUTPATIENT
Start: 2020-10-06 | End: 2020-10-06 | Stop reason: HOSPADM

## 2020-10-06 RX ORDER — NALOXONE HYDROCHLORIDE 0.4 MG/ML
0.4 INJECTION, SOLUTION INTRAMUSCULAR; INTRAVENOUS; SUBCUTANEOUS
Status: DISCONTINUED | OUTPATIENT
Start: 2020-10-06 | End: 2020-10-06 | Stop reason: HOSPADM

## 2020-10-06 RX ORDER — SODIUM CHLORIDE 0.9 % (FLUSH) 0.9 %
5-40 SYRINGE (ML) INJECTION AS NEEDED
Status: DISCONTINUED | OUTPATIENT
Start: 2020-10-06 | End: 2020-10-06 | Stop reason: HOSPADM

## 2020-10-06 RX ORDER — DEXTROMETHORPHAN/PSEUDOEPHED 2.5-7.5/.8
1.2 DROPS ORAL
Status: DISCONTINUED | OUTPATIENT
Start: 2020-10-06 | End: 2020-10-06 | Stop reason: HOSPADM

## 2020-10-06 RX ORDER — OXYCODONE HYDROCHLORIDE 15 MG/1
7.5 TABLET ORAL
COMMUNITY

## 2020-10-06 RX ORDER — CYCLOBENZAPRINE HCL 10 MG
TABLET ORAL
COMMUNITY

## 2020-10-06 RX ORDER — SODIUM CHLORIDE 0.9 % (FLUSH) 0.9 %
5-40 SYRINGE (ML) INJECTION EVERY 8 HOURS
Status: DISCONTINUED | OUTPATIENT
Start: 2020-10-06 | End: 2020-10-06 | Stop reason: HOSPADM

## 2020-10-06 RX ORDER — FLUMAZENIL 0.1 MG/ML
0.2 INJECTION INTRAVENOUS
Status: DISCONTINUED | OUTPATIENT
Start: 2020-10-06 | End: 2020-10-06 | Stop reason: HOSPADM

## 2020-10-06 RX ORDER — SODIUM CHLORIDE 9 MG/ML
INJECTION, SOLUTION INTRAVENOUS
Status: DISCONTINUED | OUTPATIENT
Start: 2020-10-06 | End: 2020-10-06 | Stop reason: HOSPADM

## 2020-10-06 RX ADMIN — LIDOCAINE HYDROCHLORIDE 100 MG: 20 INJECTION, SOLUTION EPIDURAL; INFILTRATION; INTRACAUDAL; PERINEURAL at 14:20

## 2020-10-06 RX ADMIN — PROPOFOL 50 MG: 10 INJECTION, EMULSION INTRAVENOUS at 14:40

## 2020-10-06 RX ADMIN — PROPOFOL 50 MG: 10 INJECTION, EMULSION INTRAVENOUS at 14:27

## 2020-10-06 RX ADMIN — PROPOFOL 100 MG: 10 INJECTION, EMULSION INTRAVENOUS at 14:20

## 2020-10-06 RX ADMIN — SODIUM CHLORIDE: 900 INJECTION, SOLUTION INTRAVENOUS at 13:51

## 2020-10-06 RX ADMIN — PROPOFOL 100 MG: 10 INJECTION, EMULSION INTRAVENOUS at 14:30

## 2020-10-06 RX ADMIN — PROPOFOL 50 MG: 10 INJECTION, EMULSION INTRAVENOUS at 14:38

## 2020-10-06 RX ADMIN — PROPOFOL 50 MG: 10 INJECTION, EMULSION INTRAVENOUS at 14:34

## 2020-10-06 RX ADMIN — PROPOFOL 50 MG: 10 INJECTION, EMULSION INTRAVENOUS at 14:23

## 2020-10-06 NOTE — PROCEDURES
[address]  [address]                 NAME:  Maddie Fulton   :   1965   MRN:   [id number]     Date/Time:  10/6/2020 2:54 PM    Esophagogastroduodenoscopy (EGD) Procedure Note    :  Malathi Vance MD    Referring Provider:  Kirt Kwan NP    Anethesia/Sedation:  MAC anesthesia Propofol    Preoperative diagnosis: GERD, PERSONAL HX OF COLONIC POLYPS, WEIGHT GAIN, EPIGASTRIC PAIN, CONSTIPATION, DIVERTICULITIS    Postoperative diagnosis: Gastric ulcer    Procedure Details     After infom consent was obtained for the procedure, with all risks and benefits of procedure explained the patient was taken to the endoscopy suite and placed in the left lateral decubitus position. Following sequential administration of sedation as per above, the [id number] gastroscope was inserted into the mouth and advanced under direct vision to second portion of the duodenum. A careful inspection was made as the gastroscope was withdrawn, including a retroflexed view of the proximal stomach; findings and interventions are described below. Findings:  Esophagus:normal  Stomach:A small 4 mm non bleeding , superficial ulcer seen in antrum, biopsies done. Duodenum/jejunum:normal      Therapies:  none    Specimens: gastric ulcer bx           EBL: None    Complications:   None; patient tolerated the procedure well. Impression:    See Postoperative diagnosis above    Recommendations:  -Acid suppression with a proton pump inhibitor. , -Await pathology. , -No NSAIDS    Discharge disposition:  Home in the company of  when able to ambulate    Malathi Vance MD

## 2020-10-06 NOTE — TELEPHONE ENCOUNTER
Called pt to follow up on sx, cough and congestion. Left a generic voice message for a call back to the office to discuss.

## 2020-10-06 NOTE — ROUTINE PROCESS
Miguel Dawson 1965 
[id number] Situation: 
Verbal report received from: G. Augustine Opitz Procedure: Procedure(s): 
COLONOSCOPY AND UPPER ENDOSCOPY 
ESOPHAGOGASTRODUODENOSCOPY (EGD)    :- 
ESOPHAGOGASTRODUODENAL (EGD) BIOPSY COLON BIOPSY Background: 
 
Preoperative diagnosis: GERD, PERSONAL HX OF COLONIC POLYPS, WEIGHT GAIN, EPIGASTRIC PAIN, CONSTIPATION, DIVERTICULITIS Postoperative diagnosis: Gastric ulcer :  Dr. Cruz Delong Assistant(s): Endoscopy Technician-1: Emily Toro Endoscopy RN-1: Melvin Gloria RN Specimens:  
ID Type Source Tests Collected by Time Destination 1 : gastric Preservative Gastric  Bakari Aly MD 10/6/2020 1429 Pathology 2 : sigmoid Preservative Sigmoid  Bakari Aly MD 10/6/2020 1441 Pathology H. Pylori  no Assessment: 
Intra-procedure medications Anesthesia gave intra-procedure sedation and medications, see anesthesia flow sheet yes Intravenous fluids: NS@ Daralyn Sears Vital signs stable Abdominal assessment: round and soft Recommendation: 
Discharge patient per MD order. Family or Friend Permission to share finding with family or friend yes

## 2020-10-06 NOTE — PERIOP NOTES

## 2020-10-06 NOTE — ANESTHESIA POSTPROCEDURE EVALUATION
Post-Anesthesia Evaluation and Assessment    Patient: Miguel Dawson MRN: [id number]  SSN: [id number]    YOB: 1965  Age: 63 y.o. Sex: female      I have evaluated the patient and they are stable and ready for discharge from the PACU. Cardiovascular Function/Vital Signs  Visit Vitals  BP (!) 116/96   Pulse 95   Temp 36.5 °C (97.7 °F)   Resp 12   Ht 5' 3\" (1.6 m)   Wt 76.7 kg (169 lb)   SpO2 100%   BMI 29.94 kg/m²       Patient is status post MAC anesthesia for Procedure(s):  COLONOSCOPY AND UPPER ENDOSCOPY  ESOPHAGOGASTRODUODENOSCOPY (EGD)    :-  ESOPHAGOGASTRODUODENAL (EGD) BIOPSY  COLON BIOPSY. Nausea/Vomiting: None    Postoperative hydration reviewed and adequate. Pain:  Pain Scale 1: Numeric (0 - 10) (10/06/20 1508)  Pain Intensity 1: 0 (10/06/20 1508)   Managed    Neurological Status: At baseline    Mental Status, Level of Consciousness: Alert and  oriented to person, place, and time    Pulmonary Status:   O2 Device: Room air (10/06/20 1508)   Adequate oxygenation and airway patent    Complications related to anesthesia: None    Post-anesthesia assessment completed. No concerns    Signed By: Slade Oliveros MD     October 6, 2020              Procedure(s):  COLONOSCOPY AND UPPER ENDOSCOPY  ESOPHAGOGASTRODUODENOSCOPY (EGD)    :-  ESOPHAGOGASTRODUODENAL (EGD) BIOPSY  COLON BIOPSY. general    <BSHSIANPOST>    INITIAL Post-op Vital signs:   Vitals Value Taken Time   /75 10/6/2020  3:13 PM   Temp 36.5 °C (97.7 °F) 10/6/2020  3:01 PM   Pulse 84 10/6/2020  3:13 PM   Resp 15 10/6/2020  3:13 PM   SpO2 100 % 10/6/2020  3:08 PM   Vitals shown include unvalidated device data.

## 2020-10-06 NOTE — ANESTHESIA PREPROCEDURE EVALUATION
Relevant Problems   No relevant active problems       Anesthetic History   No history of anesthetic complications            Review of Systems / Medical History  Patient summary reviewed, nursing notes reviewed and pertinent labs reviewed    Pulmonary    COPD ( no home O2)      Smoker         Neuro/Psych         Headaches and psychiatric history     Cardiovascular    Hypertension          Hyperlipidemia    Exercise tolerance: >4 METS  Comments: Can walk 1/2 mile   GI/Hepatic/Renal     GERD           Endo/Other  Within defined limits           Other Findings   Comments: Hx of drug abuse; no gerd symptoms currently          Physical Exam    Airway  Mallampati: III  TM Distance: 4 - 6 cm  Neck ROM: normal range of motion   Mouth opening: Normal     Cardiovascular  Regular rate and rhythm,  S1 and S2 normal,  no murmur, click, rub, or gallop             Dental  No notable dental hx       Pulmonary  Breath sounds clear to auscultation               Abdominal  GI exam deferred       Other Findings            Anesthetic Plan    ASA: 3  Anesthesia type: general          Induction: Intravenous  Anesthetic plan and risks discussed with: Patient

## 2020-10-06 NOTE — H&P
Colonoscopy History and Physical      The patient was seen and examined. Date of last colonoscopy: 2012, Polyps  Yes      The airway was assessed and documented. The problem list, past medical history, and medications were reviewed.      Patient Active Problem List   Diagnosis Code    Pelvic pain in female R10.2    Diverticulosis K57.90    Fibromyalgia M79.7    Hypertension I10    Migraines G43.909    Vitamin D deficiency E55.9    Hyperlipidemia E78.5    Environmental allergies Z91.09    Radicular neuropathy M54.10    Tobacco abuse Z72.0    Abdominal gas pain R14.1    GERD (gastroesophageal reflux disease) K21.9    COPD (chronic obstructive pulmonary disease) ([facility]) J44.9    Chronic pain G89.29    Opiate dependence ([facility]) F11.20    Numbness and tingling of right arm R20.0, R20.2    Risk for falls Z91.81    Cocaine abuse ([facility]) F14.10    Substance induced mood disorder ([facility]) F19.94    Polysubstance dependence including opioid type drug, continuous use ([facility]) F11.20, F19.20    Borderline personality disorder ([facility]) F60.3    Non-compliance with treatment Z91.19    Malingering Z76.5    Displacement of cervical intervertebral disc without myelopathy M50.20    Anxiety F41.9    Insomnia G47.00    History of drug overdose Z91.89    Narcotic induced mental alteration R41.82, T40.605A    Acute metabolic encephalopathy V15.04    Stenosis of both internal carotid arteries I65.23    Syncope and collapse R55    Hemiplegia affecting left nondominant side ([facility]) G81.94    Major depression F32.9    Somatization disorder F45.0    Bipolar disorder ([facility]) F31.9    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     Social History     Socioeconomic History    Marital status: SINGLE     Spouse name: Not on file    Number of children: 1    Years of education: Not on file    Highest education level: Not on file   Occupational History    Occupation: disabled from pain/psych     Employer: NOT EMPLOYED   Social Needs    Financial resource strain: Not on file    Food insecurity     Worry: Not on file     Inability: Not on file    Transportation needs     Medical: Not on file     Non-medical: Not on file   Tobacco Use    Smoking status: Current Every Day Smoker     Packs/day: 1.00     Types: Cigarettes     Start date: 1    Smokeless tobacco: Never Used    Tobacco comment: 1 ppd  started age 11. Working on using E-cigs to quit   Substance and Sexual Activity    Alcohol use: Yes     Comment: socially, usually 2 at a time. max 3 at a time     Drug use: No     Comment: remote h/o drug use in early 2000s - cocaine, MJ    Sexual activity: Not Currently     Partners: Male   Lifestyle    Physical activity     Days per week: Not on file     Minutes per session: Not on file    Stress: Not on file   Relationships    Social connections     Talks on phone: Not on file     Gets together: Not on file     Attends Faith service: Not on file     Active member of club or organization: Not on file     Attends meetings of clubs or organizations: Not on file     Relationship status: Not on file    Intimate partner violence     Fear of current or ex partner: Not on file     Emotionally abused: Not on file     Physically abused: Not on file     Forced sexual activity: Not on file   Other Topics Concern    Not on file   Social History Narrative    T The patient has one adult dgtr. The patient's source of income comes from disability. The patient has not been a victim of sexual/physical abuse. The highest grade achieved is 10th . The patient is single. pt now on disability, after rejection several times. Patient with history of working as a  before quitting. Patient completed 2 years of college at [employer]. Lives with niece.      Past Medical History:   Diagnosis Date    Anxiety     Arthritis     Bipolar disorder ([facility]     bi polar Dr. Yves Gonzalez has dismissed her as a pt    Cocaine abuse ([facility] 10/12    Based on ER urine tox screen    COPD     Dissociative identity disorder [facility])     Drug overdose, intentional ([facility]     Drug-induced delirium 2016    Fibromyalgia 2007    Rheum Keven Saeed p [phone], f [phone]    Fracture of lumbar spine ([facility]     Found on MRI    Hyperlipidemia 2011    Hypertension     Migraines     Multiple personality disorder ([facility]     Neuropathy     Radicular neuropathy     [facility] Reji Hartman at [facility]. Neck and back.  Suicidal ideations     [facility]    Tobacco abuse     Vitamin D deficiency          Prior to Admission Medications   Prescriptions Last Dose Informant Patient Reported? Taking? DULoxetine (CYMBALTA) 20 mg capsule 10/5/2020 at Unknown time  Yes Yes   Sig: Take 20 mg by mouth daily. Nebulizer & Compressor machine   No No   Si Each by Does Not Apply route as needed for Wheezing or Shortness of Breath. albuterol (Ventolin HFA) 90 mcg/actuation inhaler 10/5/2020 at Unknown time  No Yes   Si PUFFS EVERY 4 HOURS AS NEEDED FOR WHEEZING OR SHORTNESS OF BREATH   albuterol-ipratropium (DUO-NEB) 2.5 mg-0.5 mg/3 ml nebu Not Taking at Unknown time  No No   Sig: 3 mL by Nebulization route every six (6) hours as needed (as needed). atorvastatin (LIPITOR) 80 mg tablet 10/5/2020 at Unknown time  No Yes   Sig: Take 1 Tab by mouth daily. cholecalciferol (VITAMIN D3) (1000 Units /25 mcg) tablet 10/5/2020 at Unknown time  No Yes   Sig: Take 2 Tabs by mouth daily. cyclobenzaprine (FLEXERIL) 10 mg tablet 10/5/2020 at Unknown time  Yes Yes   Sig: Take  by mouth three (3) times daily as needed for Muscle Spasm(s). diclofenac EC (VOLTAREN) 75 mg EC tablet 10/5/2020 at Unknown time  No Yes   Sig: Take 1 Tab by mouth two (2) times a day. With food. ergocalciferol (ERGOCALCIFEROL) 1,250 mcg (50,000 unit) capsule 10/5/2020 at Unknown time  No Yes   Sig: Take 1 Cap by mouth every seven (7) days.  Indications: vitamin D deficiency (high dose therapy) famotidine (PEPCID) 20 mg tablet 10/5/2020 at Unknown time  No Yes   Sig: TAKE ONE TABLET BY MOUTH EVERY NIGHT. - FOR ACID REFLUX   fluticasone propionate (FLONASE) 50 mcg/actuation nasal spray 10/5/2020 at Unknown time  No Yes   Si Sprays by Both Nostrils route daily. hydrOXYzine HCL (ATARAX) 25 mg tablet 10/5/2020 at Unknown time  No Yes   Sig: TAKE ONE TABLET BY MOUTH EVERY 8 HOURS AS NEEDED FOR ITCHING. lisinopriL (PRINIVIL, ZESTRIL) 40 mg tablet 10/5/2020 at Unknown time  No Yes   Sig: Take 1 Tab by mouth daily. - blood pressure   loratadine (CLARITIN) 10 mg tablet 10/5/2020 at Unknown time  No Yes   Sig: Take 1 Tab by mouth daily. lurasidone (LATUDA) 60 mg tab tablet 10/5/2020 at Unknown time  Yes Yes   Sig: Take  by mouth. oxyCODONE IR (OXY-IR) 15 mg immediate release tablet 10/5/2020 at Unknown time  Yes Yes   Sig: Take 7.5 mg by mouth every four (4) hours as needed for Pain.   polyethylene glycol (MIRALAX) 17 gram/dose powder 2020 at Unknown time  No Yes   Sig: MIX 17 GM (FILL CAP FULL WITH POWDER) WITH 8OZ OF WATER, AND DRINK, ONCE A DAY, FOR CONSTIPATION  Indications: constipation   umeclidinium-vilanteroL (ANORO ELLIPTA) 62.5-25 mcg/actuation inhaler 10/5/2020 at Unknown time  No Yes   Sig: Take 1 Puff by inhalation daily. Indications: bronchospasm prevention with COPD      Facility-Administered Medications: None       The patient was seen and examined in the endoscopy suite. The airway was assessed and documented. The problem list and medications were reviewed. Chief complaint, history of present illness, and review of systems and Past medical History are positive for: abdominal pain , GERD and histosy of polyps    The heart, lungs and mental status were satisfactory for the administration of sedation and for the procedure. I discussed with the patient the objectives, risks, consequences and alternatives to the procedure.      The patient was counseled at length about the risks of tee Covid-19 in the princess-operative and post-operative states including the recovery window of their procedure. The patient was made aware that tee Covid-19 after a surgical procedure may worsen their prognosis for recovering from the virus and lend to a higher morbidity and or mortality risk. The patient was given the options of postponing their procedure. All of the risks, benefits, and alternatives were discussed. The patient does  wish to proceed with the procedure.     Plan: Endoscopic procedure with sedation     Teressa Romo MD   10/6/2020  2:20 PM

## 2020-10-06 NOTE — PROCEDURES
[address]  [address] [facility]                 Colonoscopy Procedure Note    Indications:   See Preoperative Diagnosis above  Referring Physician: Abdulaziz Browne NP  Anesthesia/Sedation: MAC anesthesia Propofol  Endoscopist:  Dr. Cristiano Alberto  Assistant:  Endoscopy Technician-1: Dia Glaser  Endoscopy RN-1: Eliana Costa RN  Preoperative diagnosis: GERD, PERSONAL HX OF COLONIC POLYPS, WEIGHT GAIN, EPIGASTRIC PAIN, CONSTIPATION, DIVERTICULITIS  Postoperative diagnosis: Gastric ulcer      Procedure in Detail:  Informed consent was obtained for the procedure, including sedation. Risks of perforation, hemorrhage, adverse drug reaction, and aspiration were discussed. The patient was placed in the left lateral decubitus position. Based on the pre-procedure assessment, including review of the patient's medical history, medications, allergies, and review of systems, she had been deemed to be an appropriate candidate for moderate sedation; she was therefore sedated with the medications listed above. The patient was monitored continuously with ECG tracing, pulse oximetry, blood pressure monitoring, and direct observations. A rectal examination was performed. The [id number] was inserted into the rectum and advanced under direct vision to the cecum, which was identified by the ileocecal valve and appendiceal orifice. The quality of the colonic preparation was good. A careful inspection was made as the colonoscope was withdrawn, including a retroflexed view of the rectum; findings and interventions are described below. Appropriate photodocumentation was obtained. Findings:  Rectum: normal  Sigmoid: severe diverticulosis; mild patchy erythema in mid sigmoid biopsied  Descending Colon: moderate diverticulosis;  Transverse Colon: moderate diverticulosis; Ascending Colon: mild diverticulosis; Cecum: mild diverticulosis;       Specimens:    sigmoid bx    EBL: None    Complications: None; patient tolerated the procedure well. Recommendations:     - Await pathology. - For colon cancer screening in this average-risk patient, colonoscopy may be repeated in 10 years. - High fiber diet.         Signed By: Lencho River MD                        October 6, 2020

## 2020-10-06 NOTE — DISCHARGE INSTRUCTIONS
Fermin Lance  [id number]  1965    COLON/EGD DISCHARGE INSTRUCTIONS  Discomfort:  Redness at IV site- apply warm compress to area; if redness or soreness persist- contact your physician  There may be a slight amount of blood passed from the rectum  Gaseous discomfort- walking, belching will help relieve any discomfort  Sore throat- throat lozenges or warm salt water gargle  You may not operate a vehicle for 12 hours  You may not engage in an occupation involving machinery or appliances for rest of today  You may not drink alcoholic beverages for at least 12 hours  Avoid making any critical decisions for at least 24 hour  DIET:   High fiber diet. - however -  remember your colon is empty and a heavy meal will produce gas. Avoid these foods:  vegetables, fried / greasy foods, carbonated drinks for today. MEDICATIONS:      Regarding Aspirin or Nonsteroidal medications, please see below. ACTIVITY:  You may resume your normal daily activities it is recommended that you spend the remainder of the day resting -  avoid any strenuous activity. CALL M.D. ANY SIGN OF:  Increasing pain, nausea, vomiting  Abdominal distension (swelling)  New increased bleeding (oral or rectal)  Fever (chills)  Pain in chest area  Bloody discharge from nose or mouth  Shortness of breath    You may not  take any Advil, Aspirin, Ibuprofen, Motrin, Aleve, or Goodys , ONLY  Tylenol as needed for pain.  Take Pantoprazole as prescribed    Post procedure diagnosis: Gastric ulcer        Follow-up Instructions:   Call Dr. Shannan An  Results of procedure / biopsy in 10 days  Telephone #  [phone]      DISCHARGE SUMMARY from Nurse    The following personal items collected during your admission are returned to you:   Dental Appliance: Dental Appliances: None  Vision: Visual Aid: None  Hearing Aid:    Jewelry:    Clothing:    Other Valuables:    Valuables sent to safe:      Patient Education   Patient Education        Peptic Ulcer Disease: Care Instructions  Your Care Instructions     Peptic ulcers are sores on the inside of the stomach or the small intestine (such as a duodenal ulcer). They are most often caused by an infection with bacteria or from use of nonsteroidal anti-inflammatory drugs (NSAIDs). NSAIDs include aspirin, ibuprofen (Advil), and naproxen (Aleve). Your doctor may have prescribed medicine to reduce stomach acid. You also may need to take antibiotics if your peptic ulcers are caused by an infection. You can help yourself heal and keep ulcers from coming back by making some changes in your lifestyle. Quit smoking. Limit alcohol. Follow-up care is a key part of your treatment and safety. Be sure to make and go to all appointments, and call your doctor if you are having problems. It's also a good idea to know your test results and keep a list of the medicines you take. How can you care for yourself at home? · Be safe with medicines. Take your medicines exactly as prescribed. Call your doctor if you think you are having a problem with your medicine. · Do not take aspirin or other NSAIDs such as ibuprofen (Advil or Motrin) or naproxen (Aleve). Ask your doctor what you can take for pain. · Do not smoke. Smoking can make ulcers worse. If you need help quitting, talk to your doctor about stop-smoking programs and medicines. These can increase your chances of quitting for good. · Drink in moderation or avoid drinking alcohol. · Eat a balanced diet of small, frequent meals. See a dietitian if you need help planning your meals. When should you call for help? Call 911 anytime you think you may need emergency care. For example, call if:    · You vomit blood or what looks like coffee grounds.     · You pass maroon or very bloody stools. Call your doctor now or seek immediate medical care if:    · You have new or worse belly pain.     · Your stools are black and look like tar, or they have streaks of blood.     · You vomit. Watch closely for changes in your health, and be sure to contact your doctor if:    · You do not get better as expected. Where can you learn more? Go to http://www.gray.com/  Enter I361 in the search box to learn more about \"Peptic Ulcer Disease: Care Instructions. \"  Current as of: April 15, 2020               Content Version: 12.6  © 4693-6276 [employer]. Care instructions adapted under license by [employer] (which disclaims liability or warranty for this information). If you have questions about a medical condition or this instruction, always ask your healthcare professional. [place] [address] any warranty or liability for your use of this information. Diverticulosis: Care Instructions  Your Care Instructions  In diverticulosis, pouches called diverticula form in the wall of the large intestine (colon). The pouches do not cause any pain or other symptoms. Most people who have diverticulosis do not know they have it. But the pouches sometimes bleed, and if they become infected, they can cause pain and other symptoms. When this happens, it is called diverticulitis. Diverticula form when pressure pushes the wall of the colon outward at certain weak points. A diet that is too low in fiber can cause diverticula. Follow-up care is a key part of your treatment and safety. Be sure to make and go to all appointments, and call your doctor if you are having problems. It's also a good idea to know your test results and keep a list of the medicines you take. How can you care for yourself at home? · Include fruits, leafy green vegetables, beans, and whole grains in your diet each day. These foods are high in fiber. · Take a fiber supplement, such as Citrucel or Metamucil, every day if needed. Read and follow all instructions on the label. · Drink plenty of fluids, enough so that your urine is light yellow or clear like water.  If you have kidney, heart, or liver disease and have to limit fluids, talk with your doctor before you increase the amount of fluids you drink. · Get at least 30 minutes of exercise on most days of the week. Walking is a good choice. You also may want to do other activities, such as running, swimming, cycling, or playing tennis or team sports. · Cut out foods that cause gas, pain, or other symptoms. When should you call for help? Call your doctor now or seek immediate medical care if:    · You have belly pain.     · You pass maroon or very bloody stools.     · You have a fever.     · You have nausea and vomiting.     · You have unusual changes in your bowel movements or abdominal swelling.     · You have burning pain when you urinate.     · You have abnormal vaginal discharge.     · You have shoulder pain.     · You have cramping pain that does not get better when you have a bowel movement or pass gas.     · You pass gas or stool from your urethra while urinating. Watch closely for changes in your health, and be sure to contact your doctor if you have any problems. Where can you learn more? Go to http://www.gray.com/  Enter [id number] in the search box to learn more about \"Diverticulosis: Care Instructions. \"  Current as of: April 15, 2020               Content Version: 12.6  © 6474-0556 [employer]. Care instructions adapted under license by [employer] (which disclaims liability or warranty for this information). If you have questions about a medical condition or this instruction, always ask your healthcare professional. [place] [address] any warranty or liability for your use of this information.

## 2020-10-08 ENCOUNTER — TELEPHONE (OUTPATIENT)
Dept: FAMILY MEDICINE CLINIC | Age: 55
End: 2020-10-08

## 2020-10-08 PROBLEM — R73.03 PREDIABETES: Status: ACTIVE | Noted: 2020-10-08

## 2020-10-09 ENCOUNTER — TELEPHONE (OUTPATIENT)
Dept: FAMILY MEDICINE CLINIC | Age: 55
End: 2020-10-09

## 2020-10-09 NOTE — TELEPHONE ENCOUNTER
Returned phone call to pt to follow up on cough and congestion. Left a generic voice message for a call back to the office.

## 2020-10-09 NOTE — TELEPHONE ENCOUNTER
Returned phone call to pt regarding cough and congestion. Pt states that she went to [facility] and was diagnosed with a upper respiratory infection. Pt states that she was prescribed some coughing pills that are not very effective at night time. Offered to make pt a follow up appointment but pt stated that she did not think it was necessary and has a hx of COPD. Pt did state that she would like a cough syrup prescribed but after talking with pts provider, MANSOOR Medeiros states that they give her coughing pills because the syrup has codeine in it and will interact with other medications. Verified understanding.

## 2020-10-30 DIAGNOSIS — F41.9 ANXIETY: ICD-10-CM

## 2020-10-30 RX ORDER — HYDROXYZINE 25 MG/1
TABLET, FILM COATED ORAL
Qty: 180 TAB | Refills: 0 | Status: SHIPPED | OUTPATIENT
Start: 2020-10-30 | End: 2021-03-15 | Stop reason: SDUPTHER

## 2020-11-10 ENCOUNTER — OFFICE VISIT (OUTPATIENT)
Dept: FAMILY MEDICINE CLINIC | Age: 55
End: 2020-11-10
Payer: MEDICARE

## 2020-11-10 VITALS
SYSTOLIC BLOOD PRESSURE: 138 MMHG | OXYGEN SATURATION: 97 % | HEIGHT: 63 IN | RESPIRATION RATE: 17 BRPM | HEART RATE: 88 BPM | WEIGHT: 172 LBS | TEMPERATURE: 97.5 F | DIASTOLIC BLOOD PRESSURE: 87 MMHG | BODY MASS INDEX: 30.48 KG/M2

## 2020-11-10 DIAGNOSIS — F31.9 BIPOLAR AFFECTIVE DISORDER, REMISSION STATUS UNSPECIFIED (HCC): ICD-10-CM

## 2020-11-10 DIAGNOSIS — I10 ESSENTIAL HYPERTENSION: ICD-10-CM

## 2020-11-10 DIAGNOSIS — Z02.89 ENCOUNTER FOR COMPLETION OF FORM WITH PATIENT: Primary | ICD-10-CM

## 2020-11-10 PROCEDURE — 99213 OFFICE O/P EST LOW 20 MIN: CPT | Performed by: NURSE PRACTITIONER

## 2020-11-10 PROCEDURE — G8752 SYS BP LESS 140: HCPCS | Performed by: NURSE PRACTITIONER

## 2020-11-10 PROCEDURE — G8754 DIAS BP LESS 90: HCPCS | Performed by: NURSE PRACTITIONER

## 2020-11-10 PROCEDURE — G8427 DOCREV CUR MEDS BY ELIG CLIN: HCPCS | Performed by: NURSE PRACTITIONER

## 2020-11-10 PROCEDURE — G9717 DOC PT DX DEP/BP F/U NT REQ: HCPCS | Performed by: NURSE PRACTITIONER

## 2020-11-10 PROCEDURE — 3017F COLORECTAL CA SCREEN DOC REV: CPT | Performed by: NURSE PRACTITIONER

## 2020-11-10 PROCEDURE — G8417 CALC BMI ABV UP PARAM F/U: HCPCS | Performed by: NURSE PRACTITIONER

## 2020-11-10 RX ORDER — EMTRICITABINE AND TENOFOVIR DISOPROXIL FUMARATE 100; 150 MG/1; MG/1
TABLET, FILM COATED ORAL
COMMUNITY

## 2020-11-10 NOTE — PROGRESS NOTES
S: Sandi Wallace is a 63 y.o. female who brings a form for completion    Assessment/Plan:    1. Encounter for completion of form with patient  -11/19/20 [facility]   -procedure: Hysteroscopy, D&C,  myosure polypecotmy w/anesthesia   -form completed and faxed to [phone]    2. Essential hypertension  -current therapy: lisinopril 40mg  -BP today - 138/87    3. Bipolar affective disorder, remission status unspecified ([facility] [address]  -current therapy: lurasidone 60mg + duloxetine 20mg + hydroxyzine 25mg prn  -psych Dr Miguel Durand - left solo practice unexpectedly; pt will need refills on meds within next month  -advised I would bridge rx for 1-2months, but pt needs to establish care elsewhere   - REFERRAL TO PSYCHIATRY    RTC 3 months HTN check        HPI:  BP today - 138/87    11/19/20 - Hysteroscopy, D&C,  myosure polypecotmy w/anesthesia   Dr. Selma Gitelman form completed for clearance  - not preop, (cpe = 9/15/20)     Dr Miguel Durand - psych   Left w/o giving her referral  May run out of meds before she can establish with a different psych  Advised I would write a bridge rx for her if needed, but I don't manage those meds so she needs to establish with psych      Review of Systems:  - Constitutional Symptoms: no fevers, chills, weight loss  - Eyes: no blurry vision or double vision  - Cardiovascular: no chest pain or palpitations  - Respiratory: no cough or shortness of breath  ROS is negative otherwise. Social history:   Nutrition: has been gaining weight  Stress eating d/t covid/quarantine  Physical: none - discussed   Social: accompanied by mother today    Social History     Tobacco Use   Smoking Status Current Every Day Smoker    Packs/day: 1.00    Types: Cigarettes    Start date: 1978   Smokeless Tobacco Never Used   Tobacco Comment    1 ppd  started age 11.  Working on using E-cigs to quit     Social History     Substance and Sexual Activity   Alcohol Use Yes    Comment: socially, usually 2 at a time. max 3 at a time      Social History     Substance and Sexual Activity   Drug Use No    Comment: remote h/o drug use in early 2000s - cocaine, MJ        I reviewed the following:  Past Medical History:   Diagnosis Date    Anxiety     Arthritis     Bipolar disorder ([facility] [address]     bi polar Dr. Luis Nogueira has dismissed her as a pt    Cocaine abuse ([facility] [address] 10/12    Based on ER urine tox screen    COPD     Dissociative identity disorder [facility])     Drug overdose, intentional ([facility] [address]     Drug-induced delirium 11/11/2016    Fibromyalgia 6/2007    Rheum Ismaelo Briseydae p [phone], f [phone]    Fracture of lumbar spine ([facility] [address]     Found on MRI    Hyperlipidemia 9/29/2011    Hypertension     Migraines     Multiple personality disorder ([facility] [address]     Neuropathy     Radicular neuropathy     Dr. Trace Adkins at [facility]. Neck and back.  Suicidal ideations     Suicidal overdose ([facility] [address] 11/6/2016    Tobacco abuse     Vitamin D deficiency        Current Outpatient Medications   Medication Sig Dispense Refill    emtricitabine-tenofovir, TDF, (Truvada) 100-150 mg tab Take  by mouth.  hydrOXYzine HCL (ATARAX) 25 mg tablet TAKE ONE TABLET BY MOUTH EVERY 8 HOURS AS NEEDED FOR ITCHING. 180 Tab 0    oxyCODONE IR (OXY-IR) 15 mg immediate release tablet Take 7.5 mg by mouth every four (4) hours as needed for Pain.  lurasidone (LATUDA) 60 mg tab tablet Take  by mouth.  cyclobenzaprine (FLEXERIL) 10 mg tablet Take  by mouth three (3) times daily as needed for Muscle Spasm(s).  ergocalciferol (ERGOCALCIFEROL) 1,250 mcg (50,000 unit) capsule Take 1 Cap by mouth every seven (7) days. Indications: vitamin D deficiency (high dose therapy) 12 Cap 0    atorvastatin (LIPITOR) 80 mg tablet Take 1 Tab by mouth daily. 90 Tab 1    umeclidinium-vilanteroL (ANORO ELLIPTA) 62.5-25 mcg/actuation inhaler Take 1 Puff by inhalation daily.  Indications: bronchospasm prevention with COPD 60 Inhaler 1    Nebulizer & Compressor machine 1 Each by Does Not Apply route as needed for Wheezing or Shortness of Breath. 1 Each 0    loratadine (CLARITIN) 10 mg tablet Take 1 Tab by mouth daily. 90 Tab 1    lisinopriL (PRINIVIL, ZESTRIL) 40 mg tablet Take 1 Tab by mouth daily. - blood pressure 90 Tab 0    fluticasone propionate (FLONASE) 50 mcg/actuation nasal spray 2 Sprays by Both Nostrils route daily. 16 g 0    albuterol-ipratropium (DUO-NEB) 2.5 mg-0.5 mg/3 ml nebu 3 mL by Nebulization route every six (6) hours as needed (as needed). 30 Nebule 1    albuterol (Ventolin HFA) 90 mcg/actuation inhaler 2 PUFFS EVERY 4 HOURS AS NEEDED FOR WHEEZING OR SHORTNESS OF BREATH 18 g 1    famotidine (PEPCID) 20 mg tablet TAKE ONE TABLET BY MOUTH EVERY NIGHT. - FOR ACID REFLUX 30 Tab 0    cholecalciferol (VITAMIN D3) (1000 Units /25 mcg) tablet Take 2 Tabs by mouth daily. 180 Tab 1    polyethylene glycol (MIRALAX) 17 gram/dose powder MIX 17 GM (FILL CAP FULL WITH POWDER) WITH 8OZ OF WATER, AND DRINK, ONCE A DAY, FOR CONSTIPATION  Indications: constipation 527 g 0    DULoxetine (CYMBALTA) 20 mg capsule Take 20 mg by mouth daily.  diclofenac EC (VOLTAREN) 75 mg EC tablet Take 1 Tab by mouth two (2) times a day. With food. 20 Tab 0       Allergies   Allergen Reactions    Vicodin [Hydrocodone-Acetaminophen] Itching     O: VS:   Visit Vitals  /87 (BP 1 Location: Left arm, BP Patient Position: Sitting)   Pulse 88   Temp 97.5 °F (36.4 °C) (Temporal)   Resp 17   Ht 5' 3\" (1.6 m)   Wt 172 lb (78 kg)   LMP 03/01/2015   SpO2 97%   BMI 30.47 kg/m²     GENERAL: Phil Palencia is in no acute distress. Non-toxic. Well nourished. Well developed. Appropriately groomed. RESP: Breath sounds are symmetrical bilaterally. Unlabored without SOB. Speaking in full sentences. Clear to auscultation bilaterally anteriorly and posteriorly. No wheezes. No rales or rhonchi. CV: normal rate. Regular rhythm. S1, S2 audible. No murmur noted.   No rubs, clicks or gallops noted.  HEME/LYMPH: peripheral pulses palpable 2+ x 4 extremities. No peripheral edema is noted. No cervical adenopathy noted. SKIN: clean dry and intact throughout. No rashes, erythema, ecchymosis, lacerations, abrasions, suspicious moles noted. PSYCH: appropriate behavior, dress . Clear and coherent speech. Full affect.    _________________________________________________________________  Patient education was done. Advised on nutrition, physical activity, weight management, tobacco, alcohol and safety. Counseling included discussion of diagnosis, differentials, treatment options, prescribed treatment, warning signs and follow up. Medication risks/benefits,interactions and alternatives discussed with patient.      Patient verbalized understanding and agreed to plan of care. Patient was given an after visit summary which included current diagnoses, medications and vital signs.

## 2020-11-10 NOTE — PATIENT INSTRUCTIONS
1) Form for surgical clearance completed and faxed to [phone]. 2) Please check your blood pressure through the week at staggered times in the day. (If you check in the morning, it should be at least one hour after your morning blood pressure medications.) Arm monitors are most accurate. If you use a wrist monitor, make sure your wrist is at heart level. You can bring your home monitor to your next visit and have it calibrated with the machine in the office to gauge your readings. Sit  with your feet uncrossed and relax for 5 minutes before taking your BP. Keep a written record of your blood pressure readings and bring it to each appointment. If your systolic (top) blood pressure is consistently greater than 140mmHg or less than [id number], OR the diastolic (bottom) number is consistently greater than 90mmHg or less than 60mmHg, then please schedule an office appointment. Work on healthy eating - no salt diet, more potassium (helps flush out sodium,making healthier heart and arteries) - and incorporating daily exercise into your routine. Losing weight can help reduce your blood pressure! Cardiac symptoms that would need immediate attention include: uncomfortable pressure, squeezing, fullness or pain in the center of your chest. Pain or discomfort in one or both arms, the back, neck, jaw or stomach. Shortness of breath with or without chest discomfort, breaking out in a cold sweat, nausea or lightheadedness. Learning About High Blood Pressure What is high blood pressure? Blood pressure is a measure of how hard the blood pushes against the walls of your arteries. It's normal for blood pressure to go up and down throughout the day. But if it stays up, you have high blood pressure. Another name for high blood pressure is hypertension. Two numbers tell you your blood pressure. The first number is the systolic pressure (top number).  It shows how hard the blood pushes when your heart is pumping. The second number is the diastolic pressure (bottom number). It shows how hard the blood pushes between heartbeats, when your heart is relaxed and filling with blood. Your doctor will give you a goal for your blood pressure based on your health and your age. High blood pressure (hypertension) means that the top number stays high, or the bottom number stays high, or both. High blood pressure increases the risk of stroke, heart attack, and other problems. What happens when you have high blood pressure? · Blood flows through your arteries with too much force. Over time, this can damage the heart and the walls of your arteries. But you can't feel it. High blood pressure usually doesn't cause symptoms. · High blood pressure makes your heart work harder. And that can lead to heart failure, which means your heart doesn't pump as much blood as your body needs. · Fat and calcium start to build up in your arteries. This buildup is called hardening of the arteries. It can cause many problems including a heart attack and stroke. · Arteries also carry blood and oxygen to organs like your eyes, kidneys, and brain. If high blood pressure damages those arteries, it can lead to vision loss, kidney disease, stroke, and a higher risk of dementia. How can you prevent high blood pressure? · Stay at a healthy weight. · Try to limit how much sodium you eat to less than 2,300 milligrams (mg) a day. If you limit your sodium to 1,500 mg a day, you can lower your blood pressure even more. ? Buy foods that are labeled \"unsalted,\" \"sodium-free,\" or \"low-sodium. \" Foods labeled \"reduced-sodium\" and \"light sodium\" may still have too much sodium. ? Flavor your food with garlic, lemon juice, onion, vinegar, herbs, and spices instead of salt. Do not use soy sauce, steak sauce, onion salt, garlic salt, mustard, or ketchup on your food. ? Use less salt (or none) when recipes call for it.  You can often use half the salt a recipe calls for without losing flavor. · Be physically active. Get at least 30 minutes of exercise on most days of the week. Walking is a good choice. You also may want to do other activities, such as running, swimming, cycling, or playing tennis or team sports. · Limit alcohol to 2 drinks a day for men and 1 drink a day for women. · Eat plenty of fruits, vegetables, and low-fat dairy products. Eat less saturated and total fats. How is high blood pressure treated? · Your doctor will suggest making lifestyle changes to help your heart. For example, your doctor may ask you to eat healthy foods, quit smoking, lose extra weight, and be more active. · If lifestyle changes don't help enough, your doctor may recommend that you take medicine. · When blood pressure is very high, medicines are needed to lower it. Follow-up care is a key part of your treatment and safety. Be sure to make and go to all appointments, and call your doctor if you are having problems. It's also a good idea to know your test results and keep a list of the medicines you take. Where can you learn more? Go to http://leonie-deborah.info/ Enter P501 in the search box to learn more about \"Learning About High Blood Pressure. \" Current as of: December 16, 2019               Content Version: 12.6 © 5107-5477 [employer]. Care instructions adapted under license by [employer] (which disclaims liability or warranty for this information). If you have questions about a medical condition or this instruction, always ask your healthcare professional. [address] any warranty or liability for your use of this information.

## 2020-11-10 NOTE — PROGRESS NOTES
Georgia Bella is a 63 y.o. female    HIPAA verified by two patient identifiers. Chief Complaint   Patient presents with    Blood Pressure Check     Pt states that she is having a GYN procedure on 11/18/2020 and needs to be cleared for her BP. Pt states that she does not have a form.  Medication Refill     Pt states that she would like to discuss getting her psych meds as well     1. Have you been to the ER, urgent care clinic since your last visit? Hospitalized since your last visit? No    2. Have you seen or consulted any other health care providers outside of the [address] [place] [address] since your last visit? Include any pap smears or colon screening.  No    Visit Vitals  /87 (BP 1 Location: Left arm, BP Patient Position: Sitting)   Pulse 88   Temp 97.5 °F (36.4 °C) (Temporal)   Resp 17   Ht 5' 3\" (1.6 m)   Wt 172 lb (78 kg)   LMP 03/01/2015   SpO2 97%   BMI 30.47 kg/m²       Pain Scale: 0 - No pain/10  Pain Location:

## 2020-11-19 ENCOUNTER — HOSPITAL ENCOUNTER (OUTPATIENT)
Dept: LAB | Age: 55
Discharge: HOME | End: 2020-11-19

## 2020-12-18 ENCOUNTER — OFFICE VISIT (OUTPATIENT)
Dept: URGENT CARE | Age: 55
End: 2020-12-18
Payer: MEDICARE

## 2020-12-18 VITALS — HEART RATE: 89 BPM | OXYGEN SATURATION: 97 % | TEMPERATURE: 99 F | RESPIRATION RATE: 16 BRPM

## 2020-12-18 DIAGNOSIS — Z20.822 EXPOSURE TO COVID-19 VIRUS: Primary | ICD-10-CM

## 2020-12-18 PROCEDURE — G9717 DOC PT DX DEP/BP F/U NT REQ: HCPCS | Performed by: FAMILY MEDICINE

## 2020-12-18 PROCEDURE — G8427 DOCREV CUR MEDS BY ELIG CLIN: HCPCS | Performed by: FAMILY MEDICINE

## 2020-12-18 PROCEDURE — 3017F COLORECTAL CA SCREEN DOC REV: CPT | Performed by: FAMILY MEDICINE

## 2020-12-18 PROCEDURE — G8417 CALC BMI ABV UP PARAM F/U: HCPCS | Performed by: FAMILY MEDICINE

## 2020-12-18 PROCEDURE — G8756 NO BP MEASURE DOC: HCPCS | Performed by: FAMILY MEDICINE

## 2020-12-18 PROCEDURE — 99202 OFFICE O/P NEW SF 15 MIN: CPT | Performed by: FAMILY MEDICINE

## 2020-12-18 NOTE — LETTER
December 18, 2020 [phone] Dear Claude Hogue: 
Thank you for requesting access to [facility]. Please follow the instructions below to securely access and download your online medical record. [facility] allows you to send messages to your doctor, view your test results, renew your prescriptions, schedule appointments, and more. How Do I Sign Up? 1. In your internet browser, go to [email or web address]. 2. Click on the First Time User? Click Here link in the Sign In box. You will see the New Member Sign Up page. 3. Enter your [facility] Access Code exactly as it appears below. You will not need to use this code after youve completed the sign-up process. If you do not sign up before the expiration date, you must request a new code. [facility] Access Code: [facility] Expires: 2/1/2021 10:17 AM  
 
4. Enter the last four digits of your Social Security Number (xxxx) and Date of Birth (mm/dd/yyyy) as indicated and click Submit. You will be taken to the next sign-up page. 5. Create a [facility] ID. This will be your [facility] login ID and cannot be changed, so think of one that is secure and easy to remember. 6. Create a [facility] password. You can change your password at any time. 7. Enter your Password Reset Question and Answer. This can be used at a later time if you forget your password. 8. Enter your e-mail address. You will receive e-mail notification when new information is available in [address]. 9. Click Sign Up. You can now view and download portions of your medical record. 10. Click the Download Summary menu link to download a portable copy of your medical information. Additional Information If you have questions, please visit the Frequently Asked Questions section of the [facility] website at [email or web address] Remember, [facility] is NOT to be used for urgent needs. For medical emergencies, dial 911. Now available from your iPhone and Android! Sincerely, [employer]

## 2020-12-18 NOTE — PROGRESS NOTES
This patient was seen at [address] [place] [address] [facility]gent Care while in their vehicle due to COVID-19 pandemic with PPE and focused examination in order to decrease community viral transmission. The patient/guardian gave verbal consent to treat. Ronni Larson is a 63 y.o. female who presents for COVID-19 testing. Was exposed to COVID-19 by relative who lives in the house who tested positive 1 week ago. Denies cough, fever, SOB. The history is provided by the patient. Past Medical History:   Diagnosis Date    Anxiety     Arthritis     Bipolar disorder ([address]     bi polar Dr. Eloisa Bustamante has dismissed her as a pt    Cocaine abuse ([address] 10/12    Based on ER urine tox screen    COPD     Dissociative identity disorder [facility])     Drug overdose, intentional ([address]     Drug-induced delirium 11/11/2016    Fibromyalgia 6/2007    [facility] p [phone], f [phone]    Fracture of lumbar spine ([address]     Found on MRI    Hyperlipidemia 9/29/2011    Hypertension     Migraines     Multiple personality disorder ([address]     Neuropathy     Radicular neuropathy     Dr. America Ha at [address] [place] [address]. Neck and back.      Suicidal ideations     Suicidal overdose ([address] 11/6/2016    Tobacco abuse     Vitamin D deficiency         Past Surgical History:   Procedure Laterality Date    COLONOSCOPY N/A 10/6/2020    COLONOSCOPY AND UPPER ENDOSCOPY performed [facility]da Kerr [facility] at [facility] ENDOSCOPY    HX [address] Left 2012    left    HX CERVICAL FUSION  6/9/14    C5-C6 Dr. Sandrine Gordon HX COLONOSCOPY  9/2011    Dr. Brock Nail -     HX ORTHOPAEDIC  2009    left rotator cuff surgery in 2009    HX SKIN BIOPSY      rash         Family History   Problem Relation Age of Onset    High Cholesterol Mother     Heart Failure Father     Hypertension Father     Arthritis-rheumatoid Father     Stroke Father     Other Daughter         scleroderma and raynauds    Other Sister     Other Sister sarcoidosis    Cancer Brother         liver?  Cancer Brother         ?pancreas    Hypertension Brother     Thyroid Disease Brother     Prostate Cancer Brother     Hypertension Brother     Hypertension Brother     Diabetes Brother     Heart Disease Brother     Hypertension Brother     Thyroid Cancer Brother         Social History     Socioeconomic History    Marital status: SINGLE     Spouse name: Not on file    Number of children: 1    Years of education: Not on file    Highest education level: Not on file   Occupational History    Occupation: disabled from pain/psych     Employer: NOT EMPLOYED   Social Needs    Financial resource strain: Not on file    Food insecurity     Worry: Not on file     Inability: Not on file    Transportation needs     Medical: Not on file     Non-medical: Not on file   Tobacco Use    Smoking status: Current Every Day Smoker     Packs/day: 1.00     Types: Cigarettes     Start date: 1978    Smokeless tobacco: Never Used    Tobacco comment: 1 ppd  started age 11. Working on using E-cigs to quit   Substance and Sexual Activity    Alcohol use: Yes     Comment: socially, usually 2 at a time.  max 3 at a time     Drug use: No     Comment: remote h/o drug use in early 2000s - cocaine, MJ    Sexual activity: Not Currently     Partners: Male   Lifestyle    Physical activity     Days per week: Not on file     Minutes per session: Not on file    Stress: Not on file   Relationships    Social connections     Talks on phone: Not on file     Gets together: Not on file     Attends Advent service: Not on file     Active member of club or organization: Not on file     Attends meetings of clubs or organizations: Not on file     Relationship status: Not on file    Intimate partner violence     Fear of current or ex partner: Not on file     Emotionally abused: Not on file     Physically abused: Not on file     Forced sexual activity: Not on file   Other Topics Concern    Not on file   Social History Narrative    T The patient has one adult dgtr. The patient's source of income comes from disability. The patient has not been a victim of sexual/physical abuse. The highest grade achieved is 10th . The patient is single. pt now on disability, after rejection several times. Patient with history of working as a  before quitting. Patient completed 2 years of college at [employer]. Lives with niece. ALLERGIES: Vicodin [hydrocodone-acetaminophen]    Review of Systems   Constitutional: Negative for activity change, appetite change, chills and fever. HENT: Negative for congestion, rhinorrhea and sore throat. Respiratory: Negative for cough, shortness of breath and wheezing. Cardiovascular: Negative for chest pain. Gastrointestinal: Negative for abdominal pain, diarrhea, nausea and vomiting. Musculoskeletal: Negative for myalgias. Neurological: Negative for headaches. Vitals:    12/18/20 1028   Pulse: 89   Resp: 16   Temp: 99 °F (37.2 °C)   SpO2: 97%       Physical Exam  Vitals signs and nursing note reviewed. Constitutional:       General: She is not in acute distress. Appearance: She is well-developed. She is not diaphoretic. Neurological:      Mental Status: She is alert. Psychiatric:         Behavior: Behavior normal.         Thought Content: Thought content normal.         Judgment: Judgment normal.         MDM    ICD-10-CM ICD-9-CM   1.  Exposure to COVID-19 virus  Z20.828 V01.79       Orders Placed This Encounter    NOVEL CORONAVIRUS (COVID-19)     Scheduling Instructions:      1) Due to current limited availability of the COVID-19 PCR test, tests will be prioritized and may not be completed.              2) Order only if the test result will change clinical management or necessary for a return to mission-critical employment decision.              3) Print and instruct patient to adhere to [facility] home isolation program. (Yovana Kasper 4) Set up or refer patient for a monitoring program.              5) Have patient sign up for and leverage MyChart (if not previously done). Order Specific Question:   Is this test for diagnosis or screening? Answer:   Screening     Order Specific Question:   Symptomatic for COVID-19 as defined by CDC? Answer:   No     Order Specific Question:   Hospitalized for COVID-19? Answer:   No     Order Specific Question:   Admitted to ICU for COVID-19? Answer:   No     Order Specific Question:   Employed in healthcare setting? Answer:   No     Order Specific Question:   Resident in a congregate (group) care setting? Answer:   No     Order Specific Question:   Pregnant? Answer:   No     Order Specific Question:   Previously tested for COVID-19? Answer:   Yes        Quarantine x 7 more days  Deep breathing exercises, ambulation    If signs and symptoms become worse the pt is to go to the ER.          Procedures

## 2020-12-20 LAB — SARS-COV-2, NAA: NOT DETECTED

## 2020-12-22 DIAGNOSIS — I10 ESSENTIAL HYPERTENSION: ICD-10-CM

## 2020-12-22 DIAGNOSIS — J44.1 CHRONIC OBSTRUCTIVE PULMONARY DISEASE WITH ACUTE EXACERBATION (HCC): ICD-10-CM

## 2020-12-26 RX ORDER — ALBUTEROL SULFATE 90 UG/1
AEROSOL, METERED RESPIRATORY (INHALATION)
Qty: 18 G | Refills: 0 | Status: SHIPPED | OUTPATIENT
Start: 2020-12-26 | End: 2021-01-29

## 2020-12-26 RX ORDER — LISINOPRIL 40 MG/1
TABLET ORAL
Qty: 90 TAB | Refills: 0 | Status: SHIPPED | OUTPATIENT
Start: 2020-12-26

## 2021-02-16 RX ORDER — UMECLIDINIUM BROMIDE AND VILANTEROL TRIFENATATE 62.5; 25 UG/1; UG/1
1 POWDER RESPIRATORY (INHALATION) DAILY
Qty: 3 INHALER | Refills: 0 | Status: SHIPPED | OUTPATIENT
Start: 2021-02-16

## 2021-02-16 NOTE — TELEPHONE ENCOUNTER
Last visit 11/10/2020 NP Joanna Mahmood   Next appointment Nothing scheduled   Previous refill encounter(s)   11/23/2020 Anoro Ellipta #1 with 2 refills. Requested Prescriptions     Pending Prescriptions Disp Refills    umeclidinium-vilanteroL (Anoro Ellipta) 62.5-25 mcg/actuation inhaler 3 Inhaler 0     Sig: Take 1 Puff by inhalation daily.

## 2021-03-15 DIAGNOSIS — F41.9 ANXIETY: ICD-10-CM

## 2021-03-15 NOTE — TELEPHONE ENCOUNTER
Last visit 11/10/2020 NP Sharyn Carvalho   Next appointment Nothing scheduled   Previous refill encounter(s)   10/30/2020 Atarax #180,   09/17/2020 Lipitor #90 with 1 refill. Requested Prescriptions     Pending Prescriptions Disp Refills    atorvastatin (LIPITOR) 80 mg tablet 90 Tab 0     Sig: Take 1 Tab by mouth daily.  hydrOXYzine HCL (ATARAX) 25 mg tablet 90 Tab 0     Sig: Take 1 Tab by mouth every eight (8) hours as needed for Itching.

## 2021-03-17 RX ORDER — ATORVASTATIN CALCIUM 80 MG/1
80 TABLET, FILM COATED ORAL DAILY
Qty: 90 TAB | Refills: 0 | Status: SHIPPED | OUTPATIENT
Start: 2021-03-17

## 2021-03-17 RX ORDER — HYDROXYZINE 25 MG/1
25 TABLET, FILM COATED ORAL
Qty: 90 TAB | Refills: 0 | Status: SHIPPED | OUTPATIENT
Start: 2021-03-17

## 2021-04-05 NOTE — PROGRESS NOTES
Called and spoke with Didier Causey today. I reminded her who I am and that I wanted to check in with her and see how she is doing? She relates she is good. I asked if there was anything I could help her with. She relates that she has spoken with the [address] [employer] and they are working on some things. She relates that she feels things are being taken care of. Encouraged to call me as needed. F/u 4/8/21  Pt on pending. s Maya Hamlin is a 63 y.o. female    HIPAA verified by two patient identifiers. Chief Complaint   Patient presents with    Skin Problem     Pt states she has a abscess on her face    Medication Refill    Annual Wellness Visit    Referral / Consult     Dermatology      1. Have you been to the ER, urgent care clinic since your last visit? Hospitalized since your last visit? No    2. Have you seen or consulted any other health care providers outside of the [address] [place] [address] since your last visit? Include any pap smears or colon screening.  No    Visit Vitals  Resp 17   Ht 5' 3\" (1.6 m)   Wt 169 lb 1.6 oz (76.7 kg)   LMP 03/01/2015   BMI 29.95 kg/m²       Pain Scale: 0 - No pain/10  Pain Location:

## 2022-03-19 PROBLEM — R73.03 PREDIABETES: Status: ACTIVE | Noted: 2020-10-08

## 2023-05-30 RX ORDER — ALBUTEROL SULFATE 90 UG/1
AEROSOL, METERED RESPIRATORY (INHALATION)
COMMUNITY
Start: 2021-01-29

## 2023-05-30 RX ORDER — POLYETHYLENE GLYCOL 3350 17 G/17G
POWDER, FOR SOLUTION ORAL
COMMUNITY
Start: 2020-05-22

## 2023-05-30 RX ORDER — LISINOPRIL 40 MG/1
TABLET ORAL
COMMUNITY
Start: 2020-12-26

## 2023-05-30 RX ORDER — ATORVASTATIN CALCIUM 80 MG/1
TABLET, FILM COATED ORAL DAILY
COMMUNITY
Start: 2021-03-17

## 2023-05-30 RX ORDER — DULOXETIN HYDROCHLORIDE 20 MG/1
20 CAPSULE, DELAYED RELEASE ORAL DAILY
COMMUNITY

## 2023-05-30 RX ORDER — OXYCODONE HYDROCHLORIDE 15 MG/1
TABLET ORAL EVERY 4 HOURS PRN
COMMUNITY

## 2023-05-30 RX ORDER — FLUTICASONE PROPIONATE 50 MCG
2 SPRAY, SUSPENSION (ML) NASAL DAILY
COMMUNITY
Start: 2020-09-15

## 2023-05-30 RX ORDER — ERGOCALCIFEROL 1.25 MG/1
CAPSULE ORAL
COMMUNITY
Start: 2020-09-17

## 2023-05-30 RX ORDER — FAMOTIDINE 20 MG/1
TABLET, FILM COATED ORAL
COMMUNITY
Start: 2020-07-10

## 2023-05-30 RX ORDER — DICLOFENAC SODIUM 75 MG/1
TABLET, DELAYED RELEASE ORAL 2 TIMES DAILY
COMMUNITY
Start: 2018-02-23

## 2023-05-30 RX ORDER — LURASIDONE HYDROCHLORIDE 60 MG/1
TABLET, FILM COATED ORAL
COMMUNITY

## 2023-05-30 RX ORDER — CYCLOBENZAPRINE HCL 10 MG
TABLET ORAL 3 TIMES DAILY PRN
COMMUNITY

## 2023-05-30 RX ORDER — EMTRICITABINE AND TENOFOVIR DISOPROXIL FUMARATE 100; 150 MG/1; MG/1
TABLET, FILM COATED ORAL
COMMUNITY

## 2023-05-30 RX ORDER — HYDROXYZINE HYDROCHLORIDE 25 MG/1
TABLET, FILM COATED ORAL EVERY 8 HOURS PRN
COMMUNITY
Start: 2021-03-17

## 2023-05-30 RX ORDER — UMECLIDINIUM BROMIDE AND VILANTEROL TRIFENATATE 62.5; 25 UG/1; UG/1
1 POWDER RESPIRATORY (INHALATION) DAILY
COMMUNITY
Start: 2021-02-16

## 2023-05-30 RX ORDER — IPRATROPIUM BROMIDE AND ALBUTEROL SULFATE 2.5; .5 MG/3ML; MG/3ML
SOLUTION RESPIRATORY (INHALATION)
COMMUNITY
Start: 2020-11-23

## 2023-05-30 RX ORDER — LORATADINE 10 MG/1
TABLET ORAL DAILY
COMMUNITY
Start: 2020-09-15

## 2023-11-02 ENCOUNTER — NURSE TRIAGE (OUTPATIENT)
Dept: OTHER | Facility: CLINIC | Age: 58
End: 2023-11-02

## 2023-11-02 NOTE — TELEPHONE ENCOUNTER
Location of patient: [facility] call from Nathan at [facility] with [employer]. Subjective: Caller states \"I was diagnosed with inflammation of my colon in ER on 10/31/23\". Current Symptoms: Since then has been having looser bowels and much more fatigue. Abdomen feels swollen and tender to touch. No blood in stool or vomiting. Associated Symptoms: reduced activity, increased sleepiness    Pain Severity:  tender; constant, mild    Temperature: denies fever     What has been tried: Abx prescribed in ER. LMP: NA Pregnant: NA    Recommended disposition: Go to [facility], Walk In or ER as backup    Care advice provided, patient verbalizes understanding; denies any other questions or concerns; instructed to call back for any new or worsening symptoms. Patient/Caller agrees with recommended disposition; writer provided warm transfer to [facility] at [facility] for appointment scheduling as caller wants to establish care with a new PCP. Attention Provider: Thank you for allowing me to participate in the care of your patient. The patient was connected to triage in response to information provided to the ECC/PSC. Please do not respond through this encounter as the response is not directed to a shared pool.     Reason for Disposition   MILD TO MODERATE constant pain lasting > 2 hours    Protocols used: Abdomen Bloating and Swelling-ADULT-[place]

## 2025-08-27 ENCOUNTER — HOSPITAL ENCOUNTER (EMERGENCY)
Facility: HOSPITAL | Age: 60
Discharge: HOME | End: 2025-08-27
Attending: STUDENT IN AN ORGANIZED HEALTH CARE EDUCATION/TRAINING PROGRAM
Payer: MEDICARE

## 2025-08-27 VITALS
DIASTOLIC BLOOD PRESSURE: 107 MMHG | RESPIRATION RATE: 18 BRPM | BODY MASS INDEX: 33.91 KG/M2 | OXYGEN SATURATION: 96 % | SYSTOLIC BLOOD PRESSURE: 184 MMHG | HEIGHT: 63 IN | WEIGHT: 191.36 LBS | HEART RATE: 72 BPM

## 2025-08-27 DIAGNOSIS — U07.1 COVID-19: Primary | ICD-10-CM

## 2025-08-27 LAB
FLUAV RNA SPEC QL NAA+PROBE: NOT DETECTED
FLUBV RNA SPEC QL NAA+PROBE: NOT DETECTED
SARS-COV-2 RNA RESP QL NAA+PROBE: DETECTED
SOURCE: ABNORMAL

## 2025-08-27 PROCEDURE — 87636 SARSCOV2 & INF A&B AMP PRB: CPT

## 2025-08-27 PROCEDURE — 99284 EMERGENCY DEPT VISIT MOD MDM: CPT

## 2025-08-27 PROCEDURE — 96372 THER/PROPH/DIAG INJ SC/IM: CPT

## 2025-08-27 PROCEDURE — 6360000002 HC RX W HCPCS: Performed by: STUDENT IN AN ORGANIZED HEALTH CARE EDUCATION/TRAINING PROGRAM

## 2025-08-27 PROCEDURE — 6370000000 HC RX 637 (ALT 250 FOR IP): Performed by: STUDENT IN AN ORGANIZED HEALTH CARE EDUCATION/TRAINING PROGRAM

## 2025-08-27 RX ORDER — OXYCODONE AND ACETAMINOPHEN 5; 325 MG/1; MG/1
1 TABLET ORAL
Refills: 0 | Status: COMPLETED | OUTPATIENT
Start: 2025-08-27 | End: 2025-08-27

## 2025-08-27 RX ORDER — MORPHINE SULFATE 10 MG/ML
6 INJECTION INTRAVENOUS ONCE
Status: COMPLETED | OUTPATIENT
Start: 2025-08-27 | End: 2025-08-27

## 2025-08-27 RX ADMIN — MORPHINE SULFATE 6 MG: 10 INJECTION INTRAVENOUS at 01:41

## 2025-08-27 RX ADMIN — OXYCODONE HYDROCHLORIDE AND ACETAMINOPHEN 1 TABLET: 5; 325 TABLET ORAL at 02:12

## 2025-08-27 ASSESSMENT — PAIN SCALES - GENERAL: PAINLEVEL_OUTOF10: 10

## (undated) DEVICE — TUBING HYDR IRR --

## (undated) DEVICE — FORCEPS BX L240CM JAW DIA2.8MM L CAP W/ NDL MIC MESH TOOTH